# Patient Record
Sex: FEMALE | Race: WHITE | NOT HISPANIC OR LATINO | Employment: UNEMPLOYED | URBAN - METROPOLITAN AREA
[De-identification: names, ages, dates, MRNs, and addresses within clinical notes are randomized per-mention and may not be internally consistent; named-entity substitution may affect disease eponyms.]

---

## 2017-02-17 ENCOUNTER — OFFICE VISIT - HEALTHEAST (OUTPATIENT)
Dept: ENDOCRINOLOGY | Facility: CLINIC | Age: 27
End: 2017-02-17

## 2017-02-17 DIAGNOSIS — E03.9 HYPOTHYROIDISM, UNSPECIFIED TYPE: ICD-10-CM

## 2017-02-17 DIAGNOSIS — E03.4 HYPOTHYROIDISM DUE TO ACQUIRED ATROPHY OF THYROID: ICD-10-CM

## 2017-02-17 DIAGNOSIS — J02.9 SORE THROAT: ICD-10-CM

## 2017-02-20 ENCOUNTER — COMMUNICATION - HEALTHEAST (OUTPATIENT)
Dept: HEALTH INFORMATION MANAGEMENT | Facility: CLINIC | Age: 27
End: 2017-02-20

## 2017-02-23 ENCOUNTER — OFFICE VISIT - HEALTHEAST (OUTPATIENT)
Dept: FAMILY MEDICINE | Facility: CLINIC | Age: 27
End: 2017-02-23

## 2017-02-23 DIAGNOSIS — H61.21 IMPACTED CERUMEN OF RIGHT EAR: ICD-10-CM

## 2017-02-23 DIAGNOSIS — J02.9 SORE THROAT: ICD-10-CM

## 2017-02-23 ASSESSMENT — MIFFLIN-ST. JEOR: SCORE: 1293.74

## 2017-04-06 ENCOUNTER — OFFICE VISIT - HEALTHEAST (OUTPATIENT)
Dept: FAMILY MEDICINE | Facility: CLINIC | Age: 27
End: 2017-04-06

## 2017-04-06 DIAGNOSIS — M54.2 NECK PAIN ON RIGHT SIDE: ICD-10-CM

## 2017-04-06 DIAGNOSIS — S03.00XA TMJ (DISLOCATION OF TEMPOROMANDIBULAR JOINT): ICD-10-CM

## 2017-04-06 ASSESSMENT — MIFFLIN-ST. JEOR: SCORE: 1281.95

## 2017-10-13 ENCOUNTER — COMMUNICATION - HEALTHEAST (OUTPATIENT)
Dept: SCHEDULING | Facility: CLINIC | Age: 27
End: 2017-10-13

## 2017-10-13 DIAGNOSIS — N61.0 MASTITIS: ICD-10-CM

## 2017-10-24 ENCOUNTER — COMMUNICATION - HEALTHEAST (OUTPATIENT)
Dept: ENDOCRINOLOGY | Facility: CLINIC | Age: 27
End: 2017-10-24

## 2017-10-24 DIAGNOSIS — E03.4 HYPOTHYROIDISM DUE TO ACQUIRED ATROPHY OF THYROID: ICD-10-CM

## 2017-10-24 RX ORDER — THYROID 30 MG/1
TABLET ORAL
Qty: 270 TABLET | Refills: 2 | Status: ON HOLD | OUTPATIENT
Start: 2017-10-24 | End: 2023-05-23

## 2017-11-09 ENCOUNTER — OFFICE VISIT - HEALTHEAST (OUTPATIENT)
Dept: MIDWIFE SERVICES | Facility: CLINIC | Age: 27
End: 2017-11-09

## 2017-11-09 DIAGNOSIS — R87.610 ATYPICAL SQUAMOUS CELLS OF UNDETERMINED SIGNIFICANCE (ASCUS) ON PAPANICOLAOU SMEAR OF CERVIX: ICD-10-CM

## 2017-11-09 DIAGNOSIS — N91.2 AMENORRHEA: ICD-10-CM

## 2017-11-09 DIAGNOSIS — R63.6 UNDERWEIGHT: ICD-10-CM

## 2017-11-09 DIAGNOSIS — E03.9 HYPOTHYROIDISM: ICD-10-CM

## 2017-11-09 DIAGNOSIS — R10.30 LOWER ABDOMINAL PAIN: ICD-10-CM

## 2017-11-09 DIAGNOSIS — R79.89 LOW TSH LEVEL: ICD-10-CM

## 2017-11-09 DIAGNOSIS — K64.9 HEMORRHOIDS: ICD-10-CM

## 2017-11-09 RX ORDER — HYDROCORTISONE ACETATE 25 MG/1
SUPPOSITORY RECTAL
Qty: 30 SUPPOSITORY | Refills: 0 | Status: ON HOLD | OUTPATIENT
Start: 2017-11-09 | End: 2023-05-18

## 2017-11-09 ASSESSMENT — MIFFLIN-ST. JEOR: SCORE: 1270.15

## 2017-12-08 ENCOUNTER — OFFICE VISIT - HEALTHEAST (OUTPATIENT)
Dept: FAMILY MEDICINE | Facility: CLINIC | Age: 27
End: 2017-12-08

## 2017-12-08 DIAGNOSIS — J02.9 SORE THROAT: ICD-10-CM

## 2017-12-08 ASSESSMENT — MIFFLIN-ST. JEOR: SCORE: 1268.34

## 2018-07-12 ENCOUNTER — OFFICE VISIT - HEALTHEAST (OUTPATIENT)
Dept: MIDWIFE SERVICES | Facility: CLINIC | Age: 28
End: 2018-07-12

## 2018-07-12 DIAGNOSIS — E03.9 HYPOTHYROIDISM: ICD-10-CM

## 2018-07-12 DIAGNOSIS — R63.6 UNDERWEIGHT: ICD-10-CM

## 2018-07-12 DIAGNOSIS — Z01.419 WELL WOMAN EXAM: ICD-10-CM

## 2018-07-12 DIAGNOSIS — R87.610 ATYPICAL SQUAMOUS CELLS OF UNDETERMINED SIGNIFICANCE (ASCUS) ON PAPANICOLAOU SMEAR OF CERVIX: ICD-10-CM

## 2018-07-12 DIAGNOSIS — Z12.4 CERVICAL CANCER SCREENING: ICD-10-CM

## 2018-07-12 ASSESSMENT — MIFFLIN-ST. JEOR: SCORE: 1268.34

## 2018-07-13 LAB
HPV SOURCE: NORMAL
HUMAN PAPILLOMA VIRUS 16 DNA: NEGATIVE
HUMAN PAPILLOMA VIRUS 18 DNA: NEGATIVE
HUMAN PAPILLOMA VIRUS FINAL DIAGNOSIS: NORMAL
HUMAN PAPILLOMA VIRUS OTHER HR: NEGATIVE
SPECIMEN DESCRIPTION: NORMAL

## 2018-07-16 ENCOUNTER — COMMUNICATION - HEALTHEAST (OUTPATIENT)
Dept: ADMINISTRATIVE | Facility: CLINIC | Age: 28
End: 2018-07-16

## 2018-08-21 ENCOUNTER — COMMUNICATION - HEALTHEAST (OUTPATIENT)
Dept: MIDWIFE SERVICES | Facility: CLINIC | Age: 28
End: 2018-08-21

## 2018-10-27 ENCOUNTER — COMMUNICATION - HEALTHEAST (OUTPATIENT)
Dept: ENDOCRINOLOGY | Facility: CLINIC | Age: 28
End: 2018-10-27

## 2018-10-27 DIAGNOSIS — E03.4 HYPOTHYROIDISM DUE TO ACQUIRED ATROPHY OF THYROID: ICD-10-CM

## 2018-11-06 ENCOUNTER — OFFICE VISIT - HEALTHEAST (OUTPATIENT)
Dept: FAMILY MEDICINE | Facility: CLINIC | Age: 28
End: 2018-11-06

## 2018-11-06 DIAGNOSIS — R05.3 PERSISTENT COUGH: ICD-10-CM

## 2018-11-06 DIAGNOSIS — J02.9 SORE THROAT: ICD-10-CM

## 2018-11-06 LAB — DEPRECATED S PYO AG THROAT QL EIA: NORMAL

## 2018-11-06 RX ORDER — BENZONATATE 200 MG/1
200 CAPSULE ORAL 3 TIMES DAILY PRN
Qty: 21 CAPSULE | Refills: 0 | Status: ON HOLD | OUTPATIENT
Start: 2018-11-06 | End: 2023-05-18

## 2018-11-06 RX ORDER — ALBUTEROL SULFATE 90 UG/1
2 AEROSOL, METERED RESPIRATORY (INHALATION) EVERY 6 HOURS PRN
Qty: 1 EACH | Refills: 0 | Status: SHIPPED | OUTPATIENT
Start: 2018-11-06

## 2018-11-07 LAB — GROUP A STREP BY PCR: NORMAL

## 2019-05-17 ENCOUNTER — OFFICE VISIT - HEALTHEAST (OUTPATIENT)
Dept: FAMILY MEDICINE | Facility: CLINIC | Age: 29
End: 2019-05-17

## 2019-05-17 DIAGNOSIS — M54.2 ANTERIOR NECK PAIN: ICD-10-CM

## 2019-05-17 LAB
BASOPHILS # BLD AUTO: 0 THOU/UL (ref 0–0.2)
BASOPHILS NFR BLD AUTO: 0 % (ref 0–2)
EOSINOPHIL # BLD AUTO: 0.1 THOU/UL (ref 0–0.4)
EOSINOPHIL NFR BLD AUTO: 2 % (ref 0–6)
ERYTHROCYTE [DISTWIDTH] IN BLOOD BY AUTOMATED COUNT: 10.7 % (ref 11–14.5)
HCT VFR BLD AUTO: 35.8 % (ref 35–47)
HGB BLD-MCNC: 12.3 G/DL (ref 12–16)
LYMPHOCYTES # BLD AUTO: 1.4 THOU/UL (ref 0.8–4.4)
LYMPHOCYTES NFR BLD AUTO: 36 % (ref 20–40)
MCH RBC QN AUTO: 32.8 PG (ref 27–34)
MCHC RBC AUTO-ENTMCNC: 34.3 G/DL (ref 32–36)
MCV RBC AUTO: 96 FL (ref 80–100)
MONOCYTES # BLD AUTO: 0.3 THOU/UL (ref 0–0.9)
MONOCYTES NFR BLD AUTO: 8 % (ref 2–10)
NEUTROPHILS # BLD AUTO: 2 THOU/UL (ref 2–7.7)
NEUTROPHILS NFR BLD AUTO: 54 % (ref 50–70)
PLATELET # BLD AUTO: 182 THOU/UL (ref 140–440)
PMV BLD AUTO: 6.9 FL (ref 7–10)
RBC # BLD AUTO: 3.75 MILL/UL (ref 3.8–5.4)
WBC: 3.8 THOU/UL (ref 4–11)

## 2019-05-20 ENCOUNTER — COMMUNICATION - HEALTHEAST (OUTPATIENT)
Dept: FAMILY MEDICINE | Facility: CLINIC | Age: 29
End: 2019-05-20

## 2019-05-20 ENCOUNTER — RECORDS - HEALTHEAST (OUTPATIENT)
Dept: ADMINISTRATIVE | Facility: OTHER | Age: 29
End: 2019-05-20

## 2019-05-22 ENCOUNTER — COMMUNICATION - HEALTHEAST (OUTPATIENT)
Dept: FAMILY MEDICINE | Facility: CLINIC | Age: 29
End: 2019-05-22

## 2019-07-25 ENCOUNTER — COMMUNICATION - HEALTHEAST (OUTPATIENT)
Dept: MIDWIFE SERVICES | Facility: CLINIC | Age: 29
End: 2019-07-25

## 2019-09-10 ENCOUNTER — COMMUNICATION - HEALTHEAST (OUTPATIENT)
Dept: FAMILY MEDICINE | Facility: CLINIC | Age: 29
End: 2019-09-10

## 2019-09-10 DIAGNOSIS — M54.2 ANTERIOR NECK PAIN: ICD-10-CM

## 2019-10-14 ENCOUNTER — OFFICE VISIT - HEALTHEAST (OUTPATIENT)
Dept: OTOLARYNGOLOGY | Facility: CLINIC | Age: 29
End: 2019-10-14

## 2019-10-14 DIAGNOSIS — M54.2 NECK PAIN: ICD-10-CM

## 2020-01-07 ENCOUNTER — AMBULATORY - HEALTHEAST (OUTPATIENT)
Dept: NURSING | Facility: CLINIC | Age: 30
End: 2020-01-07

## 2020-03-21 ENCOUNTER — NURSE TRIAGE (OUTPATIENT)
Dept: NURSING | Facility: CLINIC | Age: 30
End: 2020-03-21

## 2020-03-22 NOTE — TELEPHONE ENCOUNTER
Caller states she is having numbness and tingling on the left side of her body. Caller states she was reading to her children when the symptoms occurred. Caller denies any slurred speech. Triage guidelines recommend to call 911, caller verbalized and understands directives.    Reason for Disposition    [1] Weakness (i.e., paralysis, loss of muscle strength) of the face, arm / hand, or leg / foot on one side of the body AND [2] sudden onset AND [3] present now    [1] Numbness (i.e., loss of sensation) of the face, arm / hand, or leg / foot on one side of the body AND [2] sudden onset AND [3] present now    Additional Information    Negative: [1] SEVERE weakness (i.e., unable to walk or barely able to walk, requires support) AND [2] new onset or worsening    Protocols used: NEUROLOGIC DEFICIT-A-AH

## 2020-03-24 ENCOUNTER — COMMUNICATION - HEALTHEAST (OUTPATIENT)
Dept: FAMILY MEDICINE | Facility: CLINIC | Age: 30
End: 2020-03-24

## 2021-05-28 NOTE — PROGRESS NOTES
Assessment/Plan:        1. Anterior neck pain  Exam findings were discussed   Plan:   - US Neck Limited;   There are two less than 1 cm nodes adjacent to the right submandibular gland  with a normal morphology per radiology     - HM1 (CBC with Diff)  WBC of 3.8 with no other abnormal value    Plan:    Ambulatory referral to ENT    At the conclusion of the encounter the plan of care, disposition and all questions were answered and reviewed, and the patient acknowledged understanding and was involved in the decision making regarding the overall care plan.           Subjective:    Patient ID:   Shayna Christy is a 29 y.o. female with a history of hypothyroidism, presenting with anterior neck pain for the past few months getting worse over time.  There is no history of injury and been seen for it given word of assurance.  The pain is located in the right  upper front of the neck by the jaw line, and feels as if having a swollen gland.  She had a CBC done last December with low white count, told to monitor.  She feels having a tight swallowing, but no pain on swallowing.  She feels to be getting sick more often and wondering about her functioning immune system.         Review of Systems  Allergy: reviewed  General : negative  A complete 7 point review of systems was obtained and is negative other than what is stated in the HPI.        The following patient's history were reviewed and updated as appropriate:   She  has a past medical history of Allergic, Disease of thyroid gland, and Varicella.  Her family history includes Cancer in her maternal grandmother and paternal grandfather; Clotting disorder in her maternal grandmother; Heart disease in her paternal grandmother; No Medical Problems in her father; Varicose Veins in her mother..      Outpatient Encounter Medications as of 5/17/2019   Medication Sig Dispense Refill     albuterol (PROAIR HFA;PROVENTIL HFA;VENTOLIN HFA) 90 mcg/actuation inhaler Inhale 2 puffs every 6  (six) hours as needed for wheezing. 1 each 0     benzonatate (TESSALON) 200 MG capsule Take 1 capsule (200 mg total) by mouth 3 (three) times a day as needed. 21 capsule 0     diaphragm wide seal 70 mm Dprh Use as directed 1 each 0     DOCOSAHEXANOIC ACID/EPA (FISH OIL ORAL) Take 1 tablet by mouth daily.       folic acid (FOLVITE) 800 MCG tablet Take 400 mcg by mouth daily.       hydrocortisone 25 mg suppository Use as directed twice daily x 2 weeks 30 suppository 0     multivitamin therapeutic (THERAGRAN) tablet Take 1 tablet by mouth daily.       ARMOUR THYROID 30 mg Tab TAKE 3 TABLETS BY MOUTH ONCE DAILY (Patient taking differently: TAKE 2 TABLETS BY MOUTH ONCE DAILY) 270 tablet 2     CHOLINE ORAL Take 1 tablet by mouth daily.       No facility-administered encounter medications on file as of 5/17/2019.          Objective:   /64 (Patient Site: Right Arm, Patient Position: Sitting, Cuff Size: Adult Regular)   Pulse (!) 102   Wt 118 lb 11.2 oz (53.8 kg)   SpO2 98%   BMI 18.59 kg/m        Physical Exam     General Appearance:    Alert, cooperative, no distress   Head:    Normocephalic, Sinus: Non tender     Ears:    bilateral TM's and external ear canals normal   Throat:   mucous membranes moist, pharynx normal without lesions   Neck:   Supple, symmetrical, trachea midline, no adenopathy;     thyroid:  no enlargement/tenderness/nodules;    Lungs:     clear to auscultation, no wheezes, rales or rhonchi, symmetric air entry     Heart:    Regular rate and rhythm, S1 and S2 normal, no murmur, rub   or gallop   Skin:   Skin color, texture, turgor normal, no rashes or lesions

## 2021-05-30 VITALS — BODY MASS INDEX: 18.83 KG/M2 | WEIGHT: 120 LBS | HEIGHT: 67 IN

## 2021-05-30 VITALS — WEIGHT: 122.6 LBS | HEIGHT: 67 IN | BODY MASS INDEX: 19.24 KG/M2

## 2021-05-31 VITALS — WEIGHT: 117 LBS | BODY MASS INDEX: 18.36 KG/M2 | HEIGHT: 67 IN

## 2021-05-31 VITALS — BODY MASS INDEX: 18.43 KG/M2 | WEIGHT: 117.4 LBS | HEIGHT: 67 IN

## 2021-06-01 VITALS — BODY MASS INDEX: 18.36 KG/M2 | HEIGHT: 67 IN | WEIGHT: 117 LBS

## 2021-06-02 VITALS — BODY MASS INDEX: 18.31 KG/M2 | WEIGHT: 116.9 LBS

## 2021-06-02 NOTE — PROGRESS NOTES
HISTORY OF PRESENT ILLNESS  Asked to see by Dr. Ritchie for neck pain. Patient reports that she has been struggling with neck pain. The right side of the neck in different spots bothers her. Going on for months. The pain occurs daily. It doesn't wake her up at night. However if she wakes up at night she feels the pain. No problems swallowing. No change in voice. No fever, sweats or chills. One spot is under the right side of the jaw and the other is in back of her hairline on the right. No dental issues. She had a crown placed about a year and a half ago well before she noticed the pain.     REVIEW OF SYSTEMS  Review of Systems: a 10-system review was performed. Pertinent positives are noted in the HPI and on a separate scanned document in the chart.    PMH, PSH, FH and SH has documented in the EHR.      EXAM    CONSTITUTIONAL  General Appearance:  Normal, well developed, well nourished, no obvious distress  Ability to Communicate:  communicates appropriately.    HEAD AND FACE  Appearance and Symmetry:  Normal, no scalp or facial scarring or suspicious lesions.  Paranasal sinuses tenderness:  Normal, Paranasal sinuses non tender    EARS  Clinical speech reception threshold:  Normal, able to hear normal speech.  Auricle:  Normal, Auricles without scars, lesions, masses.  External auditory canal:  Normal, External auditory canal normal.  Tympanic membrane:  Normal, Tympanic membranes normal without swelling or erythema.  Tympanic membrane mobility:  Normal, Normal tympanic membrane mobility.    NOSE (speculum or scope)  Architecture:  Normal, Grossly normal external nasal architecture with no masses or lesions.  Mucosa:  Normal mucosa, No polyps or masses.  Septum:  Normal, Septum non-obstructing.  Turbinates:  Normal, No turbinate abnormalities    ORAL CAVITY AND OROPHARYNX  Lips:  Normal.  Dental and gingiva:  Normal, No obvious dental or gingival disease.  Mucosa:  Normal, Moist mucous membranes.  Tongue:   Normal, Tongue mobile with no mucosal abnormalities  Hard and soft palate:  Normal, Hard and soft palate without cleft or mucosal lesions.  Oral pharynx:  Normal, Posterior pharynx without lesions or remarkable asymmetry.  Saliva:  Normal, Clear saliva.  Masses:  Normal, No palpable masses or pathologically enlarged lymph nodes.    NECK  Masses/lymph nodes:  Normal, No worrisome neck masses or lymph nodes. She has a palpable normal sized jugulodigastric lymph node on the right.   Salivary glands:  Patient describes discomfort in the right submandibular gland with palpation. Bimanual palpation of the gland is unremarkable. No evidence of mass..  Trachea and larynx position:  Normal, Trachea and larynx midline.  Thyroid:  Normal, No thyroid abnormality.  Tenderness: Discomfort right posterior neck at the nuchal line. No evidence of palpable mass or inflammation.   Suppleness:  Normal, Neck supple    NEUROLOGICAL  Speech pattern:  Normal, Proasaic    RESPIRATORY  Symmetry and Respiratory effort:  Normal, Symmetric chest movement and expansion with no increased intercostal retractions or use of accessory muscles.     IMPRESSION  Pain right submandibular triangle and right posterior neck. The posterior neck pain is musculoskeletal. Not sure of the source of the right submandibular triangle pain. She reports the pain as a 3 out of 10 and she notices it when she is not doing anything or is distracted.     RECOMMENDATION  Observation for now. If the pain worsens or she notices swelling, I advised follow up for reevaluation.     Micah White MD

## 2021-06-03 VITALS — WEIGHT: 118.7 LBS | BODY MASS INDEX: 18.59 KG/M2

## 2021-06-08 NOTE — PROGRESS NOTES
[facility]  ENDOCRINOLOGY    Thyroid Note  2/19/2017    Shayna Christy, 1990, [id number]          Reason for visit      1. Hypothyroidism, unspecified type    2. Sore throat    3. Hypothyroidism due to acquired atrophy of thyroid        HPI     Shayna Christy is a very pleasant 26 y.o. old female who presents for follow up.  SUMMARY:  Shayna returns today in f/u for her hypothyroidism.  She is postpartum and has her beautiful son Scott and her daughter Ivett with her today.  She is feeling well.  She continues to take her [place] Thyroid daily at 90 mg.  She tells me that she has had a mild sore throat for several days, just on the right side.  She also feels that she has enlarged lymph glands in her neck.  She does not feel any compressive symptoms.      Past Medical History     Patient Active Problem List   Diagnosis     Varicose Veins     Hypothyroidism     Anti-Arthur antibodies present     Diastasis recti     Sore throat       Family History       family history includes Cancer in her maternal grandmother and paternal grandfather; Clotting disorder in her maternal grandmother; Heart disease in her paternal grandmother; No Medical Problems in her father; Varicose Veins in her mother.    Social History      reports that she has never smoked. She has never used smokeless tobacco. She reports that she does not drink alcohol or use illicit drugs.      Review of Systems     Patient denies fatigue, weight changes, heat/cold intolerance, bowel/skin changes or CVS symptoms.   Remainder per HPI and per attached intake form.      Vital Signs     Visit Vitals     BP 96/64     Pulse 70     Wt Readings from Last 3 Encounters:   09/14/16 124 lb 9.6 oz (56.5 kg)   09/08/16 122 lb 8 oz (55.6 kg)   08/12/16 119 lb 1.6 oz (54 kg)       Physical Exam     General:  Normal, NIRD,appears euthyroid  Eyes:  Pupils equal, round and reactive to light; no proptosis, lid lag or  periorbital edema.  Thyroid:  Thyroid is normal.  No  tenderness or bruit  Neck: Bilateral submandibular lymph glands palpable  Musculoskeletal:  Muscle strength grossly normal without evidence of wasting.  Heart:  Regular rate and rhythm without murmur.  Lungs:  Clear to auscultation.  Abdomen: Soft, non-tender, no masses or organomegaly  Neuro: Patella Reflexes were normal.No tremors  Skin:  No acanthosis nigricans or vitiligo          Assessment     1. Hypothyroidism, unspecified type    2. Sore throat    3. Hypothyroidism due to acquired atrophy of thyroid            Plan       Will get a CBC done today,as well as f/u Thyroid levels.  She has a very slender neck and the lymph glands are uniform and match and may just be palpable because of her anatomy.  Nevertheless, we will back it up with the lab work.  She doesn't need any refills today.  Time spent with pt today, 25 min with >50% spent in counseling and coordination of care.      Samira JOHNSON Endocrinology  2/19/2017  6:47 AM      Lab Results     TSH   Date Value Ref Range Status   02/17/2017 0.04 (L) 0.30 - 5.00 uIU/mL Final     T3, TOTAL   Date Value Ref Range Status   07/11/2016 210 (H) 45 - 175 ng/dL Final     T3, FREE   Date Value Ref Range Status   11/18/2013 4.0 (H) 1.9 - 3.9 pg/mL Final     No results found for: THYROIDAB    No results found for: G5PCNPL    Imaging Results   Last thyroid ultrasound:  No results found for this or any previous visit.    Last thyroid nuclear scan:  No results found for this or any previous visit.    Current Medications     Outpatient Medications Prior to Visit   Medication Sig Dispense Refill     CHOLINE ORAL Take 1 tablet by mouth daily.       DOCOSAHEXANOIC ACID/EPA (FISH OIL ORAL) Take 1 tablet by mouth daily.       folic acid (FOLVITE) 800 MCG tablet Take 400 mcg by mouth daily.       multivitamin therapeutic (THERAGRAN) tablet Take 1 tablet by mouth daily.       thyroid, pork, (ARMOUR THYROID) 30 mg Tab Take 3 tablets daily. 270 tablet 3     No  facility-administered medications prior to visit.

## 2021-06-09 NOTE — PROGRESS NOTES
No call necessary. Discussed during appointment that patient would be called if result is positive.   Payton Powers, CNP

## 2021-06-09 NOTE — PROGRESS NOTES
"  Assessment/Plan:         1. Neck pain on right side     2. TMJ (dislocation of temporomandibular joint)       Etiology of the neck pain is unclear at this time.  However I am suspicious that it is secondary to her TMJ.  I discussed active range of motion and passive range of motion of her neck as well as wearing a mouthguard at night that she previously was prescribed to use for her TMJ.  I also discussed jaw massage to help loosen the muscles that may be causing her to have some this pain.  She was advised to follow-up with her dentist for her TMJ or if she wishes she may obtain a referral from me to be seen with 1 of her TMJ specialists.  She is in agreement with this plan will follow-up as needed.  I reassured her that I do not believe that there is anything that is enlarged in her neck or growing that is of concern.  I also discussed that she has had normal blood work indicating no acute infection previously.  She will follow-up as needed and let me know if she would like to have referral for her TMJ.        Subjective:      Shayna Christy is a 26 y.o. female who presents for continued neck pain. This pain is present on the right side of the neck that is near her lymph node and jaw line and will go into ear. It does feel like it is near her muscle. \"it is like a headache in the neck\". She has had mildly swollen lymph nodes in her neck bilaterally. She is 9 weeks postpartum.She is able to move her neck. She has been to the chiropractor and there has not been any improvement.  She does report a positive history of TMJ with her left jaw having worse symptoms than her right. She does recall a couple weeks ago having some issues with her TMJ. She has not related the TMJ to the neck pain. She denies any loss of ROM in the neck. She is right handed.      The following portions of the patient's history were reviewed and updated as appropriate: allergies, current medications and problem list.    Review of Systems " "  Pertinent items are noted in HPI.      Objective:      /70 (Patient Site: Left Arm, Patient Position: Sitting, Cuff Size: Adult Regular)  Pulse 88  Resp 16  Ht 5' 7\" (1.702 m)  Wt 120 lb (54.4 kg)  BMI 18.79 kg/m2    General appearance: alert, appears stated age and cooperative  Head: Normocephalic, without obvious abnormality, atraumatic  Ears: normal TM's and external ear canals both ears  Throat: lips, mucosa, and tongue normal; teeth and gums normal and no tonsilar hypertrophy, no exudate. Jaw clicking with TMJ  Neck: slight anterior cervical chain adenopathy present bilaterally, no carotid bruit, no JVD, supple, symmetrical, trachea midline, thyroid not enlarged, symmetric, no tenderness/mass/nodules and ROM is within normal limits  Back: symmetric, no curvature. ROM normal. No CVA tenderness.  Lungs: clear to auscultation bilaterally  Heart: regular rate and rhythm, S1, S2 normal, no murmur, click, rub or gallop  Neurologic: Grossly normal  "

## 2021-06-14 NOTE — PROGRESS NOTES
Assessment/Plan:       1. Sore throat  Rapid strep today is negative.  This will be sent on for culture for further evaluation.  I encouraged continued symptomatic management of symptoms and to follow-up if symptoms are not improving.  Likely symptoms are secondary to an upper respiratory tract infection that is viral in nature.  - Rapid Strep A Screen-Throat  - Group A Strep, RNA Direct Detection, Throat        Subjective:      Shayna Christy is a 27 y.o. female who presents for concerns of a sore throat. Symptoms started about 2 nights ago. She is not aware of any fevers. She has had some nasal congestion and body aches yesterday. She is feeling better today when compared to symptoms yesterday.   She has had a slight cough. She denies any known contacts with strep.  2 of her children have had high fevers earlier in the week with some persistence into the last couple days.  She would like to be evaluated for strep throat before the weekend starts.  She denies any headaches, cough, abdominal pain, vomiting, or diarrhea.  She also denies any chest pain, shortness of breath, or difficulty breathing.    The following portions of the patient's history were reviewed and updated as appropriate: allergies, current medications and problem list.    Review of Systems   Pertinent items are noted in HPI.      Objective:      /60  Temp 97.7  F (36.5  C)  Resp 16  LMP  (LMP Unknown)    General Appearance: Alert, cooperative, appears slightly fatigued.  Head: Normocephalic, without obvious abnormality, atraumatic.  Eyes: PERRL, conjunctiva/corneas clear, EOM's intact.  Ears: Normal TM's and external ear canals, both ears.  Nose: Nares mildly congested.  Throat: Slightly erythematous. No exudates.  Neck: Supple, symmetrical, trachea midline, no adenopathy.  Heart : normal rate, regular rhythm, normal S1, S2, no murmurs, rubs, clicks or gallops.  Lungs: Clear to auscultation bilaterally, respirations  unlabored.  Extremities: Extremities normal, atraumatic, no cyanosis or edema.  Lymph nodes: Cervical, supraclavicular, and axillary nodes normal.      Recent Results (from the past 168 hour(s))   Rapid Strep A Screen-Throat   Result Value Ref Range    Rapid Strep A Antigen No Group A Strep detected, presumptive negative No Group A Strep detected, presumptive negative

## 2021-06-14 NOTE — PROGRESS NOTES
"Assessment:     1.  Amenorrhea, likely lactational  2.  Lower abdominal cramping  3.  Hypothyroidism (long-standing) on medication therapy  4.  Low TSH (most recent 2/17/2017)  5.  Breast-feeding  6.  Underweight  7.  Hemorrhoids since recent pregnancy     Plan:      1. Discussed nutrition and exercise.    2. Blood tests: Thyroid cascade, quantitative beta hCG.  Cultures: Wet prep and GC/CT after verbal consent.  3.  Prescription for Anusol HC rectal suppositories: Twice daily ×2 weeks, #30, no refills  4. Contraception: Diaphragm or condoms  5.  Next pap due anywhere he 2019. HPV co-testing discussed with that pap, then paps q 5 yrs if both negative.  6.  Explained that amenorrhea is likely lactational but possibly related to thyroid function as well.    7.  RTC 2 months for annual physical exam, PRN    Total time spent with patient: 25 minutes greater than 50% of which was spent counseling and coordinating care    Subjective:      Shayna Christy is a 27 y.o. female who presents for intermittent lower abdominal cramping and vaginal spotting ×2 in October 2017.  \"It is like my period is trying to get started.\"  Denies fever, chills, nausea, vomiting, dysuria, unusual vaginal discharge/irritation/pruritus/malodor, constipation or diarrhea.  Patient is about 10 months postpartum, lactating/breast-feeding about 5 times per day.  Resumed menstrual cycle at 9 months postpartum with both of her daughters.  First daughter weaned herself at about 10 months and the second daughter at about 16 months.  Notably, TSH was decreased on 2/17/2017, and [place] Thyroid dosage was decreased from 90 mg per day to 60 mg per day without a follow-up TSH level.  Patient states she drinks plenty of water, eats a healthy diet including lots of vegetables, and exercises regularly at the gym.  She is about 10 pounds heavier now than she was at this time postpartum after the birth of her first daughter.  She has not experienced any recent " weight gain, weight loss, skin or hair changes.  Continues to struggle with hemorrhoids after the birth of her most recent baby.  She uses external cortisone cream for itching as needed.  Using diaphragm or condoms for birth control method.  Home pregnancy test was negative.    Healthy Habits:   Regular Exercise: Yes   Healthy Diet: Yes  Sexually active: Yes  STI risk No      There is no immunization history on file for this patient.    Gynecologic History  No LMP recorded (lmp unknown).  Contraception: diaphragm    Cancer screening:   Last Pap: 2016. Results were: ASCUS with negative HPV      OB History    Para Term  AB Living   3 3 3   3   SAB TAB Ectopic Multiple Live Births       3      # Outcome Date GA Lbr Rich/2nd Weight Sex Delivery Anes PTL Lv   3 Term 17 38w6d  6 lb 7 oz (2.92 kg) M Vag-Spont EPI N JACQUELYN   2 Term 13 40w0d 08:00 7 lb 8 oz (3.402 kg) F Vag-Spont EPI N JACQUELYN   1 Term 12 41w0d 14:00 7 lb 11 oz (3.487 kg) F Vag-Spont None N JACQUELYN          Current Outpatient Prescriptions   Medication Sig Dispense Refill     ARMOUR THYROID 30 mg Tab TAKE 3 TABLETS BY MOUTH ONCE DAILY 270 tablet 2     diaphragm wide seal 70 mm Dprh Use as directed       folic acid (FOLVITE) 800 MCG tablet Take 400 mcg by mouth daily.       multivitamin therapeutic (THERAGRAN) tablet Take 1 tablet by mouth daily.       CHOLINE ORAL Take 1 tablet by mouth daily.       DOCOSAHEXANOIC ACID/EPA (FISH OIL ORAL) Take 1 tablet by mouth daily.       hydrocortisone 25 mg suppository Use as directed twice daily x 2 weeks 30 suppository 0     No current facility-administered medications for this visit.      Past Medical History:   Diagnosis Date     Allergic     Tdap vaccine     Disease of thyroid gland     hypothyroid     Varicella     as child     Past Surgical History:   Procedure Laterality Date     WISDOM TOOTH EXTRACTION       Blood-group specific substance; Diptheria-tetanus-pertuss vacc; and Other  "allergy (see comments)  Family History   Problem Relation Age of Onset     Varicose Veins Mother      No Medical Problems Father      Cancer Maternal Grandmother      Clotting disorder Maternal Grandmother      Heart disease Paternal Grandmother      Cancer Paternal Grandfather      Social History     Social History     Marital status:      Spouse name: John     Number of children: 3     Years of education: College     Occupational History     Stay at home mom      Social History Main Topics     Smoking status: Never Smoker     Smokeless tobacco: Never Used     Alcohol use No      Comment: none while pregnant     Drug use: No     Sexual activity: Yes     Partners: Male     Other Topics Concern     Not on file     Social History Narrative    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       Review of Systems  General:  Denies problem  Gastrointestinal:  Lower abdominal cramping.  See HPI please.  Genitourinary: denies problems.  See HPI please.  Psychiatric: denies problems  Endocrine: denies problems        Objective:         Vitals:    11/09/17 1428   BP: 102/60   Pulse: 88   Weight: 117 lb 6.4 oz (53.3 kg)   Height: 5' 7\" (1.702 m)       Physical Exam:  General Appearance: Alert, cooperative, no distress, appears stated age.  Good eye contact.  Patient appears frustrated.  Skin: Skin color, texture, turgor normal, no rashes or lesions  Neck: Supple, symmetrical, trachea midline, no adenopathy;  thyroid: not enlarged, symmetric, no tenderness/mass/nodules  Lungs: Clear to auscultation bilaterally, respirations unlabored  Heart: Regular rate and rhythm, S1 and S2 normal  Abdomen: Soft, non-tender. No organomegaly or masses.  Bowel sounds within normal limits in all 4 quadrants.  Pelvic:External genitalia normal without lesions or irritation. Vagina and cervix show no lesions, inflammation, discharge or tenderness. No cystocele, No rectocele. Uterus & adnexal normal without masses or tenderness.  Small vulvar " varicosity noted on the left side.  No obvious external hemorrhoids visible.

## 2021-06-16 PROBLEM — Z78.9 USES BIRTH CONTROL: Status: ACTIVE | Noted: 2018-07-12

## 2021-06-16 PROBLEM — Z01.419 WELL WOMAN EXAM: Status: ACTIVE | Noted: 2018-07-12

## 2021-06-16 PROBLEM — Z12.4 CERVICAL CANCER SCREENING: Status: ACTIVE | Noted: 2018-07-12

## 2021-06-16 PROBLEM — K64.9 HEMORRHOIDS: Status: ACTIVE | Noted: 2017-11-09

## 2021-06-16 PROBLEM — R87.610 ATYPICAL SQUAMOUS CELLS OF UNDETERMINED SIGNIFICANCE (ASCUS) ON PAPANICOLAOU SMEAR OF CERVIX: Status: ACTIVE | Noted: 2017-11-09

## 2021-06-19 NOTE — PROGRESS NOTES
"Assessment:     1.  Annual well woman exam  2.  History of ascus Pap with negative HPV  3.  Cervical cancer screening  4.  Contraceptive management  5.  Hypothyroidism  6.  Underweight       Plan:      1. Discussed nutrition and exercise.  Advised MVI, Vitamin D3 4000IU geltab and an omega 3 supplement daily   2. Blood tests: declines all HM /screening labs  3. Breast self exam technique reviewed and patient encouraged to perform self-exam monthly.   4. Contraception: Prescribed diaphragm as requested by patient.  She exhibits very good understanding of its placement, use of spermicidal gel, timeframe it may remain and its general use.  5.  Next pap due 2019 but performed today. HPV co-testing discussed with that pap, then paps q 5 yrs if both negative.  6.  RTC 1 year for annual physical exam, PRN  7.  Follow-up with endocrinology as scheduled for management of hypothyroidism.    Subjective:      Shayna Christy is a 28 y.o. female who presents for an annual exam.  Pap smear nearly due.  States she is scheduled to see a new endocrinologist this summer to help manage hypothyroidism.  Requesting a diaphragm for contraceptive management.  States that she has used one for years successfully becoming pregnant within the first month after not using the diaphragm, \"I am very fertile.\"    Healthy Habits:   Regular Exercise: Yes   Sunscreen Use: Yes  Healthy Diet: Yes  Dental Visits Regularly: Yes  Seat Belt: Yes  Sexually active: Yes  STI risk No  domestic violence No    Self Breast Exam Monthly:Yes  Colonoscopy: N/A  Lipid Profile: No  Glucose Screen: No  Prevention of Osteoporosis: No  Last Dexa: N/A        Gynecologic History  Patient's last menstrual period was 2018 (exact date).  Contraception: Condoms, diaphragm prescribed today    Cancer screening:   Last Pap: 2016. Results were: ASCUS with negative HPV      OB History    Para Term  AB Living   3 3 3   3   SAB TAB Ectopic Multiple " Live Births       3      # Outcome Date GA Lbr Rich/2nd Weight Sex Delivery Anes PTL Lv   3 Term 01/28/17 38w6d  6 lb 7 oz (2.92 kg) M Vag-Spont EPI N JACQUELYN   2 Term 12/16/13 40w0d 08:00 7 lb 8 oz (3.402 kg) F Vag-Spont EPI N JACQUELYN   1 Term 02/09/12 41w0d 14:00 7 lb 11 oz (3.487 kg) F Vag-Spont None N JACQUELYN          Current Outpatient Prescriptions   Medication Sig Dispense Refill     ARMOUR THYROID 30 mg Tab TAKE 3 TABLETS BY MOUTH ONCE DAILY (Patient taking differently: TAKE 2 TABLETS BY MOUTH ONCE DAILY) 270 tablet 2     CHOLINE ORAL Take 1 tablet by mouth daily.       DOCOSAHEXANOIC ACID/EPA (FISH OIL ORAL) Take 1 tablet by mouth daily.       multivitamin therapeutic (THERAGRAN) tablet Take 1 tablet by mouth daily.       diaphragm wide seal 70 mm Dprh Use as directed 1 each 0     folic acid (FOLVITE) 800 MCG tablet Take 400 mcg by mouth daily.       hydrocortisone 25 mg suppository Use as directed twice daily x 2 weeks 30 suppository 0     No current facility-administered medications for this visit.      Past Medical History:   Diagnosis Date     Allergic     Tdap vaccine     Disease of thyroid gland     hypothyroid     Varicella     as child     Past Surgical History:   Procedure Laterality Date     WISDOM TOOTH EXTRACTION  2006     Blood-group specific substance; Diptheria-tetanus-pertuss vacc; and Other allergy (see comments)  Family History   Problem Relation Age of Onset     Varicose Veins Mother      No Medical Problems Father      Cancer Maternal Grandmother      Clotting disorder Maternal Grandmother      Heart disease Paternal Grandmother      Cancer Paternal Grandfather      Social History     Social History     Marital status:      Spouse name: John     Number of children: 3     Years of education: College     Occupational History     Stay at home mom      Social History Main Topics     Smoking status: Never Smoker     Smokeless tobacco: Never Used     Alcohol use No      Comment: occ     Drug use:  "No     Sexual activity: Yes     Partners: Male     Other Topics Concern     Not on file     Social History Narrative    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       Review of Systems  General:  Denies problem  Eyes: Denies problem  Ears/Nose/Throat: Denies problem  Cardiovascular: Denies problem  Respiratory:  Denies problem  Gastrointestinal:  denies problems  Genitourinary: denies problems  Musculoskeletal:  Denies problem  Skin: Denies problem  Neurologic:denies problems  Psychiatric: denies problems  Endocrine: Hypothyroidism in good control        Objective:         Vitals:    07/12/18 1008   BP: 92/60   Pulse: 80   Weight: 117 lb (53.1 kg)   Height: 5' 7\" (1.702 m)       Physical Exam:  General Appearance: Alert, cooperative, no distress, appears stated age  Skin: Skin color, texture, turgor normal, no rashes or lesions  Throat: Lips, mucosa, and tongue normal; teeth and gums normal  HEENT: grossly normal; otoscopic and opthalmic exam not performed.   Neck: Supple, symmetrical, trachea midline, no adenopathy;  thyroid: not enlarged, symmetric, no tenderness/mass/nodules  Lungs: Clear to auscultation bilaterally, respirations unlabored  Breasts: No breast masses, tenderness, asymmetry, or nipple discharge.  Heart: Regular rate and rhythm, S1 and S2 normal, no murmur  Abdomen: Soft, non-tender. No organomegaly or masses  Pelvic:External genitalia normal without lesions or irritation. Vagina and cervix show no lesions, inflammation, discharge or tenderness. No cystocele, No rectocele. Uterus & adnexal normal without masses or tenderness.       "

## 2021-06-19 NOTE — LETTER
Letter by Daniel Ritchie MD at      Author: Daniel Ritchie MD Service: -- Author Type: --    Filed:  Encounter Date: 5/22/2019 Status: (Other)         Shayna Christy  [address] [facility] [address]             May 22, 2019         Dear Ms. Christy,    Below are the results from your recent visit:    Resulted Orders   US Neck Limited    Narrative    EXAM DATE:         05/20/2019    EXAM: US SOFT TISSUE HEAD/NECK  LOCATION: [facility]  DATE/TIME: 5/20/2019 7:00 PM    INDICATION: Right submandibular pain.  COMPARISON: None.  TECHNIQUE: Routine.    FINDINGS: Study done by technologist only.    The right submandibular gland is normal. There are two less than one cm nodes  adjacent to the gland with a normal morphology. It is unclear if these are  palpable.    Thyroid gland is normal. The right lobe measures 4.4 x 1 x 0.9 cm and the left  3.6 x 0.8 x 0.9 cm. Isthmus is normal.    CONCLUSION:  1.  There are two less than 1 cm nodes adjacent to the right submandibular gland  with a normal morphology. It's unclear if these are palpable. There are  certainly no other abnormalities noted.                      Normal Ultrasound study.    Consider a follow up to ENT if persisting symptoms           Please call with questions or contact us using [employer].    Sincerely,        Electronically signed by Daniel Ritchie MD

## 2021-06-19 NOTE — LETTER
Letter by Daniel Ritchie MD at      Author: Daniel Ritchie MD Service: -- Author Type: --    Filed:  Encounter Date: 5/20/2019 Status: (Other)         Shayna Christy  [address] [facility] [address]             May 20, 2019         Dear MsKarlos Christy,    Below are the results from your recent visit:    Resulted Orders   HM1 (CBC with Diff)   Result Value Ref Range    WBC 3.8 (L) 4.0 - 11.0 thou/uL    RBC 3.75 (L) 3.80 - 5.40 mill/uL    Hemoglobin 12.3 12.0 - 16.0 g/dL    Hematocrit 35.8 35.0 - 47.0 %    MCV 96 80 - 100 fL    MCH 32.8 27.0 - 34.0 pg    MCHC 34.3 32.0 - 36.0 g/dL    RDW 10.7 (L) 11.0 - 14.5 %    Platelets 182 140 - 440 thou/uL    MPV 6.9 (L) 7.0 - 10.0 fL    Neutrophils % 54 50 - 70 %    Lymphocytes % 36 20 - 40 %    Monocytes % 8 2 - 10 %    Eosinophils % 2 0 - 6 %    Basophils % 0 0 - 2 %    Neutrophils Absolute 2.0 2.0 - 7.7 thou/uL    Lymphocytes Absolute 1.4 0.8 - 4.4 thou/uL    Monocytes Absolute 0.3 0.0 - 0.9 thou/uL    Eosinophils Absolute 0.1 0.0 - 0.4 thou/uL    Basophils Absolute 0.0 0.0 - 0.2 thou/uL                Mildly decreased white count     Lab values marked  (L) are not clinically/medically significant    Awaiting the Ultrasound         Please call with questions or contact us using [employer].    Sincerely,        Electronically signed by Daniel Ritchie MD

## 2021-06-21 NOTE — PROGRESS NOTES
Subjective:      Patient ID: Shayna Christy is a 28 y.o. female.    Chief Complaint:    HPI Shayna Christy is a 28 y.o. female who presents today complaining of cough and cold symptoms x 1.25 months. Her sxs include sore throat and cough and they have been waxing and waning throughout the month. No known sick contacts. No recent travel. She has taken Nyquil during the first weekend, but since then she has not been taking anything.  She denies any fevers, but has had some intermittent night sweats a few nights.  She denies any nasal congestion, ear pain, runny nose, sinus pain, shortness of breath, chest pain, abdominal pain, diarrhea, nausea, or vomiting.  She had a headache yesterday that was frontal.  Her cough is mostly dry.  She reports a history of exercise-induced asthma when she was a child, but she has not had any issues with that in many years and does not have an albuterol inhaler.  She has 3 young children at home, but none of the family members in her household have been having any sick symptoms.  She states that she had mono when she was young.  She denies any symptoms of GERD or history of heartburn.      Social History   Substance Use Topics     Smoking status: Never Smoker     Smokeless tobacco: Never Used     Alcohol use No      Comment: occ       Review of Systems   Constitutional: Negative for fever.        (+) intermittent night sweats   HENT: Positive for sore throat. Negative for congestion, ear pain, rhinorrhea and sinus pain.    Respiratory: Positive for cough. Negative for shortness of breath.    Cardiovascular: Negative for chest pain.   Gastrointestinal: Negative for abdominal pain, diarrhea, nausea and vomiting.   Neurological: Positive for headaches (yesterday only).       Objective:     /60 (Patient Site: Right Arm, Patient Position: Sitting, Cuff Size: Adult Regular)  Pulse 97  Temp 97.6  F (36.4  C) (Oral)   Resp 16  Wt 116 lb 14.4 oz (53 kg)  LMP 10/06/2018 (Approximate)   SpO2 99%  Breastfeeding? No  BMI 18.31 kg/m2    Physical Exam   Constitutional: She appears well-developed and well-nourished. No distress.   HENT:   Head: Normocephalic and atraumatic.   Right Ear: Tympanic membrane, external ear and ear canal normal.   Left Ear: Tympanic membrane, external ear and ear canal normal.   Mouth/Throat: Uvula is midline. Posterior oropharyngeal erythema present. No oropharyngeal exudate or posterior oropharyngeal edema.   Eyes: Conjunctivae are normal.   Neck: Normal range of motion. Neck supple.   Cardiovascular: Normal rate, regular rhythm and normal heart sounds.  Exam reveals no gallop and no friction rub.    No murmur heard.  Pulmonary/Chest: Effort normal and breath sounds normal. No respiratory distress. She has no wheezes. She has no rales.   Lymphadenopathy:     She has no cervical adenopathy.   Skin: She is not diaphoretic.   Psychiatric: She has a normal mood and affect. Her behavior is normal. Judgment and thought content normal.   Nursing note and vitals reviewed.    Clinical Decision Making:  Physical exam is benign.  I suspect multiple viral illnesses considering the waxing and waning nature of this cold.  RST was negative, confirmatory strep test pending.  Patient was  reassured that her lungs are sounding clear.  Recommend that she follow-up if her symptoms worsen or persist.  She was prescribed albuterol and benzonatate for possible bronchitis.     Assessment:     Procedures    1. Persistent cough  albuterol (PROAIR HFA;PROVENTIL HFA;VENTOLIN HFA) 90 mcg/actuation inhaler    benzonatate (TESSALON) 200 MG capsule   2. Sore throat  Rapid Strep A Screen-Throat    Group A Strep, RNA Direct Detection, Throat         Patient Instructions   There were no signs of pneumonia.    I have prescribed an albuterol inhaler to help with the cough/wheezing.    May take Tessalon Perles as needed for cough.  May also try to use Mucinex or Robitussin.    Please monitor symptoms  carefully.  If developing chest pain, shortness of breath, fever, coughing up blood, extreme fatigue, or any other new, concerning symptoms, come back to clinic or go to ER immediately.  Otherwise, if no improvement in symptoms in one week, follow-up with your primary care provider.

## 2021-06-26 ENCOUNTER — HEALTH MAINTENANCE LETTER (OUTPATIENT)
Age: 31
End: 2021-06-26

## 2021-08-06 NOTE — PROGRESS NOTES
"  Assessment/Plan:       1. Sore throat  Rapid strep negative today. This will be sent on for culture. I discussed with the patient that it is likely a virus causing her to have some lymph node swelling. She was encouraged to follow up if symptoms do not improve.   - Rapid Strep A Screen-Throat  - Group A Strep, RNA Direct Detection, Throat    2. Impacted cerumen of right ear  Cerumen impaction is likely the cause of the right ear pain. I removed the cerumen with the lightly speculum. Moderate amount of cerumen was removed from the right ear and small amount removed from the left ear. Bilateral ears were clear of debris following procedure. The procedure was tolerated well by the patient. She was encouraged to follow up if she continues to have ear pain.     Subjective:      Shayna Christy is a 26 y.o. female who presents for sore throat. She reports that she has had right sided ear pain and neck/ throat pain with a swollen lymph node. She denies fevers, sinus pain, nasal congestion, rhinitis, headaches, nausea, vomiting, or diarrhea. She is 3 weeks post partum and breast feeding. She otherwise has been feeling well. She has no other questions or concerns today.     The following portions of the patient's history were reviewed and updated as appropriate: allergies, current medications and problem list.    Review of Systems   as above 3 weeks post partum and breast feeding.       Objective:        Visit Vitals     /54 (Patient Site: Right Arm, Patient Position: Sitting, Cuff Size: Adult Regular)     Pulse 100     Resp 16     Ht 5' 7\" (1.702 m)     Wt 122 lb 9.6 oz (55.6 kg)     Breastfeeding Yes     BMI 19.2 kg/m2       General appearance: alert, appears stated age and cooperative  Head: Normocephalic, without obvious abnormality, atraumatic  Eyes: conjunctivae/corneas clear. PERRL, EOM's intact. Fundi benign.  Ears: right ear canal occulded with cerumen. Cerumen was removed with lighted speculum by myself. " Left canal has small amount of cerumen which was also removed. When Cerumen was removed TMs are dontae. Right ear canal is mildly red.   Throat: lips, mucosa, and tongue normal; teeth and gums normal  Lungs: clear to auscultation bilaterally  Heart: regular rate and rhythm, S1, S2 normal, no murmur, click, rub or gallop  Neurologic: Grossly normal   Lymph: right side anterior cervical chain with mild swelling.     Results for orders placed or performed in visit on 02/23/17   Rapid Strep A Screen-Throat   Result Value Ref Range    Rapid Strep A Antigen No Group A Strep detected No Group A Strep detected

## 2021-10-16 ENCOUNTER — HEALTH MAINTENANCE LETTER (OUTPATIENT)
Age: 31
End: 2021-10-16

## 2022-07-17 ENCOUNTER — HEALTH MAINTENANCE LETTER (OUTPATIENT)
Age: 32
End: 2022-07-17

## 2022-09-25 ENCOUNTER — HEALTH MAINTENANCE LETTER (OUTPATIENT)
Age: 32
End: 2022-09-25

## 2023-03-06 ENCOUNTER — LAB REQUISITION (OUTPATIENT)
Dept: LAB | Facility: CLINIC | Age: 33
End: 2023-03-06

## 2023-03-06 DIAGNOSIS — E03.9 HYPOTHYROIDISM, UNSPECIFIED: ICD-10-CM

## 2023-03-06 DIAGNOSIS — Z67.90 UNSPECIFIED BLOOD TYPE, RH POSITIVE: ICD-10-CM

## 2023-03-06 LAB
T3FREE SERPL-MCNC: 2.4 PG/ML (ref 2–4.4)
T4 FREE SERPL-MCNC: 0.71 NG/DL (ref 0.9–1.7)
TSH SERPL DL<=0.005 MIU/L-ACNC: 1.81 UIU/ML (ref 0.3–4.2)

## 2023-03-06 PROCEDURE — 84443 ASSAY THYROID STIM HORMONE: CPT | Performed by: NURSE PRACTITIONER

## 2023-03-06 PROCEDURE — 86850 RBC ANTIBODY SCREEN: CPT | Performed by: NURSE PRACTITIONER

## 2023-03-06 PROCEDURE — 86870 RBC ANTIBODY IDENTIFICATION: CPT | Performed by: NURSE PRACTITIONER

## 2023-03-06 PROCEDURE — 84481 FREE ASSAY (FT-3): CPT | Performed by: NURSE PRACTITIONER

## 2023-03-06 PROCEDURE — 84439 ASSAY OF FREE THYROXINE: CPT | Performed by: NURSE PRACTITIONER

## 2023-03-07 LAB
ANTIBODY SCREEN: POSITIVE
SPECIMEN EXPIRATION DATE: ABNORMAL

## 2023-03-08 LAB
ANTIBODY ID: NORMAL
SPECIMEN EXPIRATION DATE: NORMAL

## 2023-04-11 ENCOUNTER — LAB REQUISITION (OUTPATIENT)
Dept: LAB | Facility: CLINIC | Age: 33
End: 2023-04-11

## 2023-04-11 DIAGNOSIS — Z67.90 UNSPECIFIED BLOOD TYPE, RH POSITIVE: ICD-10-CM

## 2023-04-11 DIAGNOSIS — Z36.9 ENCOUNTER FOR ANTENATAL SCREENING, UNSPECIFIED: ICD-10-CM

## 2023-04-11 LAB
BASOPHILS # BLD AUTO: 0 10E3/UL (ref 0–0.2)
BASOPHILS NFR BLD AUTO: 0 %
EOSINOPHIL # BLD AUTO: 0.1 10E3/UL (ref 0–0.7)
EOSINOPHIL NFR BLD AUTO: 1 %
ERYTHROCYTE [DISTWIDTH] IN BLOOD BY AUTOMATED COUNT: 13.2 % (ref 10–15)
HCT VFR BLD AUTO: 34.9 % (ref 35–47)
HGB BLD-MCNC: 11.7 G/DL (ref 11.7–15.7)
IMM GRANULOCYTES # BLD: 0.1 10E3/UL
IMM GRANULOCYTES NFR BLD: 1 %
LYMPHOCYTES # BLD AUTO: 1.1 10E3/UL (ref 0.8–5.3)
LYMPHOCYTES NFR BLD AUTO: 14 %
MCH RBC QN AUTO: 33.6 PG (ref 26.5–33)
MCHC RBC AUTO-ENTMCNC: 33.5 G/DL (ref 31.5–36.5)
MCV RBC AUTO: 100 FL (ref 78–100)
MONOCYTES # BLD AUTO: 0.5 10E3/UL (ref 0–1.3)
MONOCYTES NFR BLD AUTO: 6 %
NEUTROPHILS # BLD AUTO: 6.4 10E3/UL (ref 1.6–8.3)
NEUTROPHILS NFR BLD AUTO: 78 %
NRBC # BLD AUTO: 0 10E3/UL
NRBC BLD AUTO-RTO: 0 /100
PLATELET # BLD AUTO: 156 10E3/UL (ref 150–450)
RBC # BLD AUTO: 3.48 10E6/UL (ref 3.8–5.2)
WBC # BLD AUTO: 8.3 10E3/UL (ref 4–11)

## 2023-04-11 PROCEDURE — 86780 TREPONEMA PALLIDUM: CPT | Performed by: OBSTETRICS & GYNECOLOGY

## 2023-04-11 PROCEDURE — 86850 RBC ANTIBODY SCREEN: CPT | Performed by: OBSTETRICS & GYNECOLOGY

## 2023-04-11 PROCEDURE — 86870 RBC ANTIBODY IDENTIFICATION: CPT | Performed by: OBSTETRICS & GYNECOLOGY

## 2023-04-11 PROCEDURE — 85025 COMPLETE CBC W/AUTO DIFF WBC: CPT | Performed by: OBSTETRICS & GYNECOLOGY

## 2023-04-11 PROCEDURE — 86886 COOMBS TEST INDIRECT TITER: CPT | Performed by: OBSTETRICS & GYNECOLOGY

## 2023-04-12 LAB
ANTIBODY ID: NORMAL
ANTIBODY SCREEN: POSITIVE
SPECIMEN EXPIRATION DATE: ABNORMAL
SPECIMEN EXPIRATION DATE: NORMAL
SPECIMEN EXPIRATION DATE: NORMAL
T PALLIDUM AB SER QL: NONREACTIVE
XXX BLOOD GROUP AB TITR SERPL: 1 {TITER}

## 2023-04-18 ENCOUNTER — TRANSFERRED RECORDS (OUTPATIENT)
Dept: HEALTH INFORMATION MANAGEMENT | Facility: CLINIC | Age: 33
End: 2023-04-18
Payer: COMMERCIAL

## 2023-04-18 ENCOUNTER — MEDICAL CORRESPONDENCE (OUTPATIENT)
Dept: HEALTH INFORMATION MANAGEMENT | Facility: CLINIC | Age: 33
End: 2023-04-18
Payer: COMMERCIAL

## 2023-04-19 ENCOUNTER — TELEPHONE (OUTPATIENT)
Dept: MULTI SPECIALTY CLINIC | Facility: CLINIC | Age: 33
End: 2023-04-19
Payer: COMMERCIAL

## 2023-04-19 ENCOUNTER — MEDICAL CORRESPONDENCE (OUTPATIENT)
Dept: HEALTH INFORMATION MANAGEMENT | Facility: CLINIC | Age: 33
End: 2023-04-19
Payer: COMMERCIAL

## 2023-04-19 NOTE — TELEPHONE ENCOUNTER
External referral from [facility], [phone]  Referring: Dr. Devon Iyer  Dx: other abnormal Glucose test    Referral and records received on 04.19 at 9:54am.

## 2023-04-21 RX ORDER — LANCETS
EACH MISCELLANEOUS
Qty: 200 EACH | Refills: 3 | Status: SHIPPED | OUTPATIENT
Start: 2023-04-21

## 2023-04-24 ENCOUNTER — VIRTUAL VISIT (OUTPATIENT)
Dept: EDUCATION SERVICES | Facility: CLINIC | Age: 33
End: 2023-04-24
Payer: COMMERCIAL

## 2023-04-24 DIAGNOSIS — O24.419 GESTATIONAL DIABETES: Primary | ICD-10-CM

## 2023-04-24 PROCEDURE — G0109 DIAB MANAGE TRN IND/GROUP: HCPCS | Mod: VID

## 2023-04-24 NOTE — GROUP NOTE
Gestational Diabetes Self-Management Education & Support  4/24/2023  [facility]     Virtual Group Class Via Medical Zoom    Start and End Time  Start Time: 1300  End Time: 1415    SUBJECTIVE / OBJECTIVE  Cultural Influences/Ethnic Background:  Not  or     Estimated Date of Delivery: Data Unavailable    1 hour OGTT  No results found for: GLU1    3 hour OGTT    Fasting  No results found for: GLF    1 hour  No results found for: GL1    2 hour  No results found for: GL2    3 hour  No results found for: GL3      Gestational Diabetes Self-Management Education & Support    Educational topics covered today:  GDM diagnosis, pathophysiology, Risks and Complications of GDM, Means of controlling GDM, Using a Blood Glucose Monitor, Blood Glucose Goals, Logging and Interpreting Glucose Results, Ketone Testing, When to Call a Diabetes Educator or OB Provider, Healthy Eating During Pregnancy, Counting Carbohydrates, Meal Planning for GDM, and Physical Activity    Educational materials provided today:   [place] Understanding Gestational Diabetes  GDM Log Book  Sharps Disposal  Care After Delivery  Pt verbalized understanding of concepts discussed and recommendations provided today.     PLAN:  Check glucose 4 times daily, before breakfast and 1 hour after each meal.     Check Ketones daily for one week, if negative, reduce testing to once a week.     Follow consistent CHO meal plan, eat CHO and protein/fat at all meals/snacks.    Call/e-mail/[employer] message diabetes educator if 3 or more blood sugars are above the goal in 1 week or if ketones are positive or with questions/concerns.        Diabetes Educator:  Blanquita Jones RD

## 2023-05-08 ENCOUNTER — VIRTUAL VISIT (OUTPATIENT)
Dept: EDUCATION SERVICES | Facility: CLINIC | Age: 33
End: 2023-05-08
Payer: COMMERCIAL

## 2023-05-08 DIAGNOSIS — O24.410 DIET CONTROLLED GESTATIONAL DIABETES MELLITUS (GDM) IN THIRD TRIMESTER: Primary | ICD-10-CM

## 2023-05-08 PROCEDURE — 98968 PH1 ASSMT&MGMT NQHP 21-30: CPT | Mod: 93

## 2023-05-08 NOTE — PROGRESS NOTES
Diabetes and Pregnancy Follow-up  Type of Service: Telephone Visit/ 24 minutes     Originating Location (Patient Location): Home  Distant Location (Provider Location): [facility]  Mode of Communication:  Telephone     Telephone Visit Start Time: 7:36 AM  Telephone Visit End Time (telephone visit stop time): 8 AM  (Video Visit through Lee Silber but patient did not have video working)     How would patient like to obtain AVS? Lee Silber      Subjective/Objective:    Shayna GARCIA Jaelyn was called for a scheduled BG review. Last date of communication was: 4/24/23.    Gestational diabetes is being managed with diet    Taking diabetes medications: no    Estimated Date of Delivery: 6/25/23 - goal is to get to 36-37 weeks  Blood Glucose/Ketone Log:    Date Ketones Fasting Post Breakfast Post Lunch Post Supper   4/26 negative 83 99 103 116   4/27 negative 87 133 94 130   4/28 negative 91 103 82* 106   4/29 negative 93 83 114 105   4/30 negative 90 127 114 127*   5/1 negative 92 83 95 123   5/2 Small (no HS snack) 80 78 92 96   5/3 negative 78 119 107 114   5/4 negative 92 107 116 114   5/5 negative 91 121 84* 96*   5/6 negative 84 148 88 118   5/7 negative 97 87 104 116   5/8 negative 88      (testing 1 hour post meals unless noted with * = 2 hours after)    Usual Intake:    Breakfast: 3 eggs, oatmeal with peanut butter and fruit OR toast and fruit   Lunch: meat and cheese sandwich and 1/2 apple   Dinner: carrots and hummus, panakukun OR burger, fries, carrots OR pizza and salad    Snack: 1/2 banana and peanut butter OR yogurt    Assessment:  Ketones: negative x12, small x1.   Fasting blood glucoses: 92% in target.  After breakfast: 92% in target.  Before lunch: n/a% in target.  After lunch: 100% in target.  Before dinner: n/a% in target.  After dinner: 92% in target.    Visit duration limited by patient's late arrival.  Patient denies questions/concerns.  Reviewed how insulin needs change during pregnancy.  Discussed  recommended meal plan, as below, given patient is pregnant with twins!  Encouraged consumption of carbohydrate + protein first at each meal and snack.  Affirmed patient for her examples of pairing carbohydrate + protein at recent meals/snacks.  Discussed impact of fat on glycemic control.  Discussed with patient labor, delivery, and post-partum.     Plan/Response:  1. Continue to test glucose 4 times per day:   Fasting (when you first awake for the day): 95 mg/dL or below   1 hour after breakfast: 140 mg/dL or below   1 hour after lunch: 140 mg/dL or below   1 hour after dinner: 140 mg/dL or below     Please bring your meter and log book to all appointments     If you miss a 1 hour after a meal test, test 2 hours after the meal.  Goal 2 hours after is 120 mg/dL or below.     2.  Check your urine ketones once a day, when you first awake for the day, until they are negative to trace for 7 days in a row.  Then decrease and check once a week.     3.  Meal Plan    Breakfast: 30 grams carbohydrate + protein   Snack: 30 grams carbohydrate + protein  Lunch: 60 grams carbohydrate + protein  Snack: 30 grams carbohydrate + protein  Dinner: 60 grams carbohydrate + protein  Snack: 30 grams carbohydrate + protein    Remember you should consume some carbohydrates every 2-3 hours while awake.    4.  Aim for 20-30 minutes of activity most days of the week (with the okay of your OB provider).      5. After delivery, check your glucose starting in the second week postpartum.  Test 4 times per week.  Twice fasting and twice 2 hours after a meal.    Fasting goal is 100 mg/dL or below   2 hours after a meal goal is 140 mg/dL or below     Please note these are different goals then when pregnant.   Bring these to your postpartum OB visit.    6.  Be screened for type 2 diabetes at 4-12 weeks postpartum and every 1-3 years there after.     7.  If you are planning future pregnancies, confirm normal glucoses before conceiving.     8.  Call  Diabetes Education at [phone] or send a [email or web address] message with:   -questions or concerns   -every 2 weeks with an update on blood sugars - next on May 22nd   -ketones that are small, moderate, or large   -3 or more blood sugars above target in a 7 day period    Heather Alcantara, MPH, RD, CDCES, LD 5/8/2023    Time Spent: 24 minutes    Any diabetes medication dose changes were made via the CDE Protocol and Collaborative Practice Agreement with the patient's referring provider. A copy of this encounter was shared with the provider.

## 2023-05-08 NOTE — LETTER
5/8/2023         RE: Shayna Christy  [address]  formerly [facility] [address]        Dear Colleague,    Thank you for referring your patient, Shayna Christy, [facility]. Please see a copy of my visit note below.    Diabetes and Pregnancy Follow-up  Type of Service: Telephone Visit/ 24 minutes     Originating Location (Patient Location): Home  Distant Location (Provider Location): [place] [facility]  Mode of Communication:  Telephone     Telephone Visit Start Time: 7:36 AM  Telephone Visit End Time (telephone visit stop time): 8 AM  (Video Visit through StormMQ but patient did not have video working)     How would patient like to obtain AVS? Storm[employer]      Subjective/Objective:    Shayna Christy was called for a scheduled BG review. Last date of communication was: 4/24/23.    Gestational diabetes is being managed with diet    Taking diabetes medications: no    Estimated Date of Delivery: 6/25/23 - goal is to get to 36-37 weeks  Blood Glucose/Ketone Log:    Date Ketones Fasting Post Breakfast Post Lunch Post Supper   4/26 negative 83 99 103 116   4/27 negative 87 133 94 130   4/28 negative 91 103 82* 106   4/29 negative 93 83 114 105   4/30 negative 90 127 114 127*   5/1 negative 92 83 95 123   5/2 Small (no HS snack) 80 78 92 96   5/3 negative 78 119 107 114   5/4 negative 92 107 116 114   5/5 negative 91 121 84* 96*   5/6 negative 84 148 88 118   5/7 negative 97 87 104 116   5/8 negative 88      (testing 1 hour post meals unless noted with * = 2 hours after)    Usual Intake:    Breakfast: 3 eggs, oatmeal with peanut butter and fruit OR toast and fruit   Lunch: meat and cheese sandwich and 1/2 apple   Dinner: carrots and hummus, panakukun OR burger, fries, carrots OR pizza and salad    Snack: 1/2 banana and peanut butter OR yogurt    Assessment:  Ketones: negative x12, small x1.   Fasting blood glucoses: 92% in target.  After breakfast: 92% in target.  Before lunch: n/a% in target.  After  lunch: 100% in target.  Before dinner: n/a% in target.  After dinner: 92% in target.    Visit duration limited by patient's late arrival.  Patient denies questions/concerns.  Reviewed how insulin needs change during pregnancy.  Discussed recommended meal plan, as below, given patient is pregnant with twins!  Encouraged consumption of carbohydrate + protein first at each meal and snack.  Affirmed patient for her examples of pairing carbohydrate + protein at recent meals/snacks.  Discussed impact of fat on glycemic control.  Discussed with patient labor, delivery, and post-partum.     Plan/Response:  1. Continue to test glucose 4 times per day:   Fasting (when you first awake for the day): 95 mg/dL or below   1 hour after breakfast: 140 mg/dL or below   1 hour after lunch: 140 mg/dL or below   1 hour after dinner: 140 mg/dL or below     Please bring your meter and log book to all appointments     If you miss a 1 hour after a meal test, test 2 hours after the meal.  Goal 2 hours after is 120 mg/dL or below.     2.  Check your urine ketones once a day, when you first awake for the day, until they are negative to trace for 7 days in a row.  Then decrease and check once a week.     3.  Meal Plan    Breakfast: 30 grams carbohydrate + protein   Snack: 30 grams carbohydrate + protein  Lunch: 60 grams carbohydrate + protein  Snack: 30 grams carbohydrate + protein  Dinner: 60 grams carbohydrate + protein  Snack: 30 grams carbohydrate + protein    Remember you should consume some carbohydrates every 2-3 hours while awake.    4.  Aim for 20-30 minutes of activity most days of the week (with the okay of your OB provider).      5. After delivery, check your glucose starting in the second week postpartum.  Test 4 times per week.  Twice fasting and twice 2 hours after a meal.    Fasting goal is 100 mg/dL or below   2 hours after a meal goal is 140 mg/dL or below     Please note these are different goals then when pregnant.   Bring  these to your postpartum OB visit.    6.  Be screened for type 2 diabetes at 4-12 weeks postpartum and every 1-3 years there after.     7.  If you are planning future pregnancies, confirm normal glucoses before conceiving.     8.  Call Diabetes Education at [phone] or send a [employer] message with:   -questions or concerns   -every 2 weeks with an update on blood sugars - next on May 22nd   -ketones that are small, moderate, or large   -3 or more blood sugars above target in a 7 day period    Heather Alcantara, MPH, RD, CDCES, LD 5/8/2023    Time Spent: 24 minutes    Any diabetes medication dose changes were made via the CDE Protocol and Collaborative Practice Agreement with the patient's referring provider. A copy of this encounter was shared with the provider.

## 2023-05-17 ENCOUNTER — TELEPHONE (OUTPATIENT)
Dept: OBGYN | Facility: HOSPITAL | Age: 33
End: 2023-05-17
Payer: COMMERCIAL

## 2023-05-18 ENCOUNTER — HOSPITAL ENCOUNTER (OUTPATIENT)
Facility: HOSPITAL | Age: 33
Discharge: HOME | End: 2023-05-18
Attending: OBSTETRICS & GYNECOLOGY | Admitting: OBSTETRICS & GYNECOLOGY
Payer: COMMERCIAL

## 2023-05-18 VITALS — BODY MASS INDEX: 23.3 KG/M2 | WEIGHT: 145 LBS | HEIGHT: 66 IN

## 2023-05-18 LAB
ABO/RH(D): ABNORMAL
ANTIBODY ID: NORMAL
ANTIBODY SCREEN: POSITIVE
HOLD SPECIMEN: NORMAL
SPECIMEN EXPIRATION DATE: ABNORMAL
SPECIMEN EXPIRATION DATE: NORMAL

## 2023-05-18 PROCEDURE — 86850 RBC ANTIBODY SCREEN: CPT | Performed by: OBSTETRICS & GYNECOLOGY

## 2023-05-18 PROCEDURE — 86870 RBC ANTIBODY IDENTIFICATION: CPT | Performed by: OBSTETRICS & GYNECOLOGY

## 2023-05-18 PROCEDURE — 86901 BLOOD TYPING SEROLOGIC RH(D): CPT | Performed by: OBSTETRICS & GYNECOLOGY

## 2023-05-18 PROCEDURE — 86780 TREPONEMA PALLIDUM: CPT | Performed by: OBSTETRICS & GYNECOLOGY

## 2023-05-18 PROCEDURE — 96372 THER/PROPH/DIAG INJ SC/IM: CPT | Performed by: OBSTETRICS & GYNECOLOGY

## 2023-05-18 PROCEDURE — 86922 COMPATIBILITY TEST ANTIGLOB: CPT | Performed by: OBSTETRICS & GYNECOLOGY

## 2023-05-18 PROCEDURE — 59025 FETAL NON-STRESS TEST: CPT | Mod: 59

## 2023-05-18 PROCEDURE — 59025 FETAL NON-STRESS TEST: CPT

## 2023-05-18 PROCEDURE — 87653 STREP B DNA AMP PROBE: CPT | Performed by: OBSTETRICS & GYNECOLOGY

## 2023-05-18 PROCEDURE — 250N000011 HC RX IP 250 OP 636: Performed by: OBSTETRICS & GYNECOLOGY

## 2023-05-18 PROCEDURE — 36415 COLL VENOUS BLD VENIPUNCTURE: CPT | Performed by: OBSTETRICS & GYNECOLOGY

## 2023-05-18 PROCEDURE — 86902 BLOOD TYPE ANTIGEN DONOR EA: CPT

## 2023-05-18 RX ORDER — VITAMIN B COMPLEX
5000 TABLET ORAL EVERY OTHER DAY
COMMUNITY

## 2023-05-18 RX ORDER — BETAMETHASONE SODIUM PHOSPHATE AND BETAMETHASONE ACETATE 3; 3 MG/ML; MG/ML
12 INJECTION, SUSPENSION INTRA-ARTICULAR; INTRALESIONAL; INTRAMUSCULAR; SOFT TISSUE ONCE
Status: COMPLETED | OUTPATIENT
Start: 2023-05-18 | End: 2023-05-18

## 2023-05-18 RX ORDER — ASCORBIC ACID 100 MG
TABLET,CHEWABLE ORAL
COMMUNITY

## 2023-05-18 RX ADMIN — BETAMETHASONE SODIUM PHOSPHATE AND BETAMETHASONE ACETATE 12 MG: 3; 3 INJECTION, SUSPENSION INTRA-ARTICULAR; INTRALESIONAL; INTRAMUSCULAR at 11:48

## 2023-05-18 NOTE — PROGRESS NOTES
Shayna arrived to [facility] for a scheduled appointment for a NST of twins at 34 weeks gestation, a second betamethasone injection, labs and a GBS culture for tomorrows induction.   NST obtained on both fetus

## 2023-05-19 ENCOUNTER — TRANSFERRED RECORDS (OUTPATIENT)
Dept: HEALTH INFORMATION MANAGEMENT | Facility: CLINIC | Age: 33
End: 2023-05-19

## 2023-05-19 ENCOUNTER — HOSPITAL ENCOUNTER (INPATIENT)
Facility: HOSPITAL | Age: 33
LOS: 4 days | Discharge: HOME | End: 2023-05-23
Attending: OBSTETRICS & GYNECOLOGY | Admitting: OBSTETRICS & GYNECOLOGY
Payer: COMMERCIAL

## 2023-05-19 DIAGNOSIS — Z98.891 S/P C-SECTION: ICD-10-CM

## 2023-05-19 DIAGNOSIS — O30.043 DICHORIONIC DIAMNIOTIC TWIN PREGNANCY IN THIRD TRIMESTER: Primary | ICD-10-CM

## 2023-05-19 LAB
BLD PROD TYP BPU: NORMAL
BLD PROD TYP BPU: NORMAL
BLOOD COMPONENT TYPE: NORMAL
BLOOD COMPONENT TYPE: NORMAL
CODING SYSTEM: NORMAL
CODING SYSTEM: NORMAL
CROSSMATCH: NORMAL
CROSSMATCH: NORMAL
GLUCOSE BLDC GLUCOMTR-MCNC: 129 MG/DL (ref 70–99)
GLUCOSE BLDC GLUCOMTR-MCNC: 146 MG/DL (ref 70–99)
GP B STREP DNA SPEC QL NAA+PROBE: NEGATIVE
UNIT ABO/RH: NORMAL
UNIT ABO/RH: NORMAL
UNIT NUMBER: NORMAL
UNIT NUMBER: NORMAL
UNIT STATUS: NORMAL
UNIT STATUS: NORMAL
UNIT TYPE ISBT: 600
UNIT TYPE ISBT: 600

## 2023-05-19 PROCEDURE — 258N000003 HC RX IP 258 OP 636: Performed by: OBSTETRICS & GYNECOLOGY

## 2023-05-19 PROCEDURE — 250N000009 HC RX 250: Performed by: OBSTETRICS & GYNECOLOGY

## 2023-05-19 PROCEDURE — 250N000013 HC RX MED GY IP 250 OP 250 PS 637: Performed by: OBSTETRICS & GYNECOLOGY

## 2023-05-19 PROCEDURE — 120N000001 HC R&B MED SURG/OB

## 2023-05-19 PROCEDURE — 250N000011 HC RX IP 250 OP 636: Performed by: OBSTETRICS & GYNECOLOGY

## 2023-05-19 PROCEDURE — 99232 SBSQ HOSP IP/OBS MODERATE 35: CPT | Performed by: NURSE PRACTITIONER

## 2023-05-19 RX ORDER — IBUPROFEN 800 MG/1
800 TABLET, FILM COATED ORAL
Status: DISCONTINUED | OUTPATIENT
Start: 2023-05-19 | End: 2023-05-21

## 2023-05-19 RX ORDER — OXYTOCIN 10 [USP'U]/ML
10 INJECTION, SOLUTION INTRAMUSCULAR; INTRAVENOUS
Status: DISCONTINUED | OUTPATIENT
Start: 2023-05-19 | End: 2023-05-21

## 2023-05-19 RX ORDER — METOCLOPRAMIDE 10 MG/1
10 TABLET ORAL EVERY 6 HOURS PRN
Status: DISCONTINUED | OUTPATIENT
Start: 2023-05-19 | End: 2023-05-21

## 2023-05-19 RX ORDER — METHYLERGONOVINE MALEATE 0.2 MG/ML
200 INJECTION INTRAVENOUS
Status: DISCONTINUED | OUTPATIENT
Start: 2023-05-19 | End: 2023-05-21

## 2023-05-19 RX ORDER — PENICILLIN G POTASSIUM 5000000 [IU]/1
5 INJECTION, POWDER, FOR SOLUTION INTRAMUSCULAR; INTRAVENOUS ONCE
Status: COMPLETED | OUTPATIENT
Start: 2023-05-19 | End: 2023-05-19

## 2023-05-19 RX ORDER — CEFAZOLIN SODIUM/WATER 2 G/20 ML
2 SYRINGE (ML) INTRAVENOUS
Status: DISCONTINUED | OUTPATIENT
Start: 2023-05-19 | End: 2023-05-19

## 2023-05-19 RX ORDER — LIDOCAINE 40 MG/G
CREAM TOPICAL
Status: DISCONTINUED | OUTPATIENT
Start: 2023-05-19 | End: 2023-05-21

## 2023-05-19 RX ORDER — CARBOPROST TROMETHAMINE 250 UG/ML
250 INJECTION, SOLUTION INTRAMUSCULAR
Status: DISCONTINUED | OUTPATIENT
Start: 2023-05-19 | End: 2023-05-21

## 2023-05-19 RX ORDER — ONDANSETRON 4 MG/1
4 TABLET, ORALLY DISINTEGRATING ORAL EVERY 6 HOURS PRN
Status: DISCONTINUED | OUTPATIENT
Start: 2023-05-19 | End: 2023-05-21

## 2023-05-19 RX ORDER — OXYTOCIN/0.9 % SODIUM CHLORIDE 30/500 ML
100-340 PLASTIC BAG, INJECTION (ML) INTRAVENOUS CONTINUOUS PRN
Status: DISCONTINUED | OUTPATIENT
Start: 2023-05-19 | End: 2023-05-21

## 2023-05-19 RX ORDER — NALOXONE HYDROCHLORIDE 0.4 MG/ML
0.2 INJECTION, SOLUTION INTRAMUSCULAR; INTRAVENOUS; SUBCUTANEOUS
Status: DISCONTINUED | OUTPATIENT
Start: 2023-05-19 | End: 2023-05-21

## 2023-05-19 RX ORDER — ONDANSETRON 2 MG/ML
4 INJECTION INTRAMUSCULAR; INTRAVENOUS EVERY 6 HOURS PRN
Status: DISCONTINUED | OUTPATIENT
Start: 2023-05-19 | End: 2023-05-21

## 2023-05-19 RX ORDER — KETOROLAC TROMETHAMINE 30 MG/ML
30 INJECTION, SOLUTION INTRAMUSCULAR; INTRAVENOUS
Status: DISCONTINUED | OUTPATIENT
Start: 2023-05-19 | End: 2023-05-21

## 2023-05-19 RX ORDER — ACETAMINOPHEN 325 MG/1
975 TABLET ORAL ONCE
Status: COMPLETED | OUTPATIENT
Start: 2023-05-19 | End: 2023-05-20

## 2023-05-19 RX ORDER — MISOPROSTOL 200 UG/1
400 TABLET ORAL
Status: DISCONTINUED | OUTPATIENT
Start: 2023-05-19 | End: 2023-05-21

## 2023-05-19 RX ORDER — CITRIC ACID/SODIUM CITRATE 334-500MG
30 SOLUTION, ORAL ORAL
Status: DISCONTINUED | OUTPATIENT
Start: 2023-05-19 | End: 2023-05-21

## 2023-05-19 RX ORDER — CEFAZOLIN SODIUM/WATER 2 G/20 ML
2 SYRINGE (ML) INTRAVENOUS
Status: COMPLETED | OUTPATIENT
Start: 2023-05-20 | End: 2023-05-20

## 2023-05-19 RX ORDER — NALOXONE HYDROCHLORIDE 0.4 MG/ML
0.4 INJECTION, SOLUTION INTRAMUSCULAR; INTRAVENOUS; SUBCUTANEOUS
Status: DISCONTINUED | OUTPATIENT
Start: 2023-05-19 | End: 2023-05-21

## 2023-05-19 RX ORDER — HYDROXYZINE HYDROCHLORIDE 50 MG/1
50 TABLET, FILM COATED ORAL 3 TIMES DAILY PRN
Status: DISCONTINUED | OUTPATIENT
Start: 2023-05-19 | End: 2023-05-23 | Stop reason: HOSPADM

## 2023-05-19 RX ORDER — SODIUM CHLORIDE, SODIUM LACTATE, POTASSIUM CHLORIDE, CALCIUM CHLORIDE 600; 310; 30; 20 MG/100ML; MG/100ML; MG/100ML; MG/100ML
INJECTION, SOLUTION INTRAVENOUS CONTINUOUS PRN
Status: DISCONTINUED | OUTPATIENT
Start: 2023-05-19 | End: 2023-05-21

## 2023-05-19 RX ORDER — NICOTINE POLACRILEX 4 MG
15-30 LOZENGE BUCCAL
Status: DISCONTINUED | OUTPATIENT
Start: 2023-05-19 | End: 2023-05-23 | Stop reason: HOSPADM

## 2023-05-19 RX ORDER — OXYTOCIN/0.9 % SODIUM CHLORIDE 30/500 ML
340 PLASTIC BAG, INJECTION (ML) INTRAVENOUS CONTINUOUS PRN
Status: DISCONTINUED | OUTPATIENT
Start: 2023-05-19 | End: 2023-05-21

## 2023-05-19 RX ORDER — DEXTROSE MONOHYDRATE 25 G/50ML
25-50 INJECTION, SOLUTION INTRAVENOUS
Status: DISCONTINUED | OUTPATIENT
Start: 2023-05-19 | End: 2023-05-23 | Stop reason: HOSPADM

## 2023-05-19 RX ORDER — TERBUTALINE SULFATE 1 MG/ML
0.25 INJECTION, SOLUTION SUBCUTANEOUS
Status: DISCONTINUED | OUTPATIENT
Start: 2023-05-19 | End: 2023-05-21

## 2023-05-19 RX ORDER — PROCHLORPERAZINE MALEATE 10 MG
10 TABLET ORAL EVERY 6 HOURS PRN
Status: DISCONTINUED | OUTPATIENT
Start: 2023-05-19 | End: 2023-05-21

## 2023-05-19 RX ORDER — MISOPROSTOL 200 UG/1
800 TABLET ORAL
Status: DISCONTINUED | OUTPATIENT
Start: 2023-05-19 | End: 2023-05-21

## 2023-05-19 RX ORDER — OXYTOCIN/0.9 % SODIUM CHLORIDE 30/500 ML
1-24 PLASTIC BAG, INJECTION (ML) INTRAVENOUS CONTINUOUS
Status: DISCONTINUED | OUTPATIENT
Start: 2023-05-19 | End: 2023-05-21

## 2023-05-19 RX ORDER — CEFAZOLIN SODIUM/WATER 2 G/20 ML
2 SYRINGE (ML) INTRAVENOUS SEE ADMIN INSTRUCTIONS
Status: DISCONTINUED | OUTPATIENT
Start: 2023-05-20 | End: 2023-05-21

## 2023-05-19 RX ORDER — PROCHLORPERAZINE 25 MG
25 SUPPOSITORY, RECTAL RECTAL EVERY 12 HOURS PRN
Status: DISCONTINUED | OUTPATIENT
Start: 2023-05-19 | End: 2023-05-21

## 2023-05-19 RX ORDER — METOCLOPRAMIDE HYDROCHLORIDE 5 MG/ML
10 INJECTION INTRAMUSCULAR; INTRAVENOUS EVERY 6 HOURS PRN
Status: DISCONTINUED | OUTPATIENT
Start: 2023-05-19 | End: 2023-05-21

## 2023-05-19 RX ORDER — MORPHINE SULFATE 10 MG/ML
10 INJECTION, SOLUTION INTRAMUSCULAR; INTRAVENOUS ONCE
Status: COMPLETED | OUTPATIENT
Start: 2023-05-19 | End: 2023-05-19

## 2023-05-19 RX ORDER — SODIUM CHLORIDE, SODIUM LACTATE, POTASSIUM CHLORIDE, CALCIUM CHLORIDE 600; 310; 30; 20 MG/100ML; MG/100ML; MG/100ML; MG/100ML
INJECTION, SOLUTION INTRAVENOUS CONTINUOUS
Status: DISCONTINUED | OUTPATIENT
Start: 2023-05-20 | End: 2023-05-21

## 2023-05-19 RX ORDER — SODIUM CHLORIDE, SODIUM LACTATE, POTASSIUM CHLORIDE, CALCIUM CHLORIDE 600; 310; 30; 20 MG/100ML; MG/100ML; MG/100ML; MG/100ML
INJECTION, SOLUTION INTRAVENOUS CONTINUOUS
Status: DISCONTINUED | OUTPATIENT
Start: 2023-05-19 | End: 2023-05-19

## 2023-05-19 RX ORDER — PENICILLIN G 3000000 [IU]/50ML
3 INJECTION, SOLUTION INTRAVENOUS EVERY 4 HOURS
Status: DISCONTINUED | OUTPATIENT
Start: 2023-05-19 | End: 2023-05-19

## 2023-05-19 RX ORDER — CEFAZOLIN SODIUM/WATER 2 G/20 ML
2 SYRINGE (ML) INTRAVENOUS SEE ADMIN INSTRUCTIONS
Status: DISCONTINUED | OUTPATIENT
Start: 2023-05-19 | End: 2023-05-19

## 2023-05-19 RX ADMIN — Medication 1 MILLI-UNITS/MIN: at 10:59

## 2023-05-19 RX ADMIN — HYDROXYZINE HYDROCHLORIDE 50 MG: 50 TABLET, FILM COATED ORAL at 23:06

## 2023-05-19 RX ADMIN — PENICILLIN G POTASSIUM 5 MILLION UNITS: 5000000 POWDER, FOR SOLUTION INTRAMUSCULAR; INTRAPLEURAL; INTRATHECAL; INTRAVENOUS at 11:18

## 2023-05-19 RX ADMIN — SODIUM CHLORIDE, POTASSIUM CHLORIDE, SODIUM LACTATE AND CALCIUM CHLORIDE 25 ML/HR: 600; 310; 30; 20 INJECTION, SOLUTION INTRAVENOUS at 10:56

## 2023-05-19 ASSESSMENT — ACTIVITIES OF DAILY LIVING (ADL)
ADLS_ACUITY_SCORE: 18
ADLS_ACUITY_SCORE: 18
WEAR_GLASSES_OR_BLIND: NO
WALKING_OR_CLIMBING_STAIRS_DIFFICULTY: NO
DOING_ERRANDS_INDEPENDENTLY_DIFFICULTY: NO
DRESSING/BATHING_DIFFICULTY: NO
FALL_HISTORY_WITHIN_LAST_SIX_MONTHS: NO
CHANGE_IN_FUNCTIONAL_STATUS_SINCE_ONSET_OF_CURRENT_ILLNESS/INJURY: NO
ADLS_ACUITY_SCORE: 18
DIFFICULTY_EATING/SWALLOWING: NO
ADLS_ACUITY_SCORE: 18
ADLS_ACUITY_SCORE: 31
TOILETING_ISSUES: NO
ADLS_ACUITY_SCORE: 31
CONCENTRATING,_REMEMBERING_OR_MAKING_DECISIONS_DIFFICULTY: NO
ADLS_ACUITY_SCORE: 35
ADLS_ACUITY_SCORE: 18

## 2023-05-19 NOTE — PLAN OF CARE
Goal Outcome Evaluation:         Spoke to Dr. Iyer we are going to do low dose pitocin, cord blood on babies d/t anti- E found in moms blood per lab, consult for NICU to see parents, consent for bilateral salpingectomy and patient is to check her own blood sugars and tell us if it is 160 or greater.

## 2023-05-19 NOTE — PLAN OF CARE
Goal Outcome Evaluation:       Baby B has been having irregular heart sounds- skipped heart beats and a galloping heart sound occasionally.

## 2023-05-19 NOTE — PLAN OF CARE
Goal Outcome Evaluation:         Patient came in for IOL 34w1 di di twins A-girl B-boy - FGR/decrease cord dopplers.  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  Checked her cervix was 2.5/60/-1 middle/medium.  Gordon 7.  Plan is pitocin low dose.  Bilateral salpingectomy is the plan and I will have her sign the consents.  FHR- baby A (140) and baby B(125).

## 2023-05-19 NOTE — CONSULTS
Neonatology Antepartum Counseling Consult:  I was asked to provide antepartum counseling for Shayna Christy at the request of Dr. Jaylon MD IOL secondary to di di twins A-girl B-boy - FGR/decrease cord dopplers.  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 Ms. Shayna Christy is currently 34 weeks/ 1 days gestation and has a history significant for:  Patient Active Problem List   Diagnosis     Varicose Veins     Hypothyroidism     Anti-Arthur antibodies present     Diastasis recti     Hemorrhoids     Underweight     Atypical squamous cells of undetermined significance (ASCUS) on Papanicolaou smear of cervix     Uses birth control     Cervical cancer screening     Well woman exam     Twins live born in hospital      Betamethasone was administered on 5/17 & 5/18/23.   Ms. Shayna Christy, accompanied by her , was counseled on the expected hospital course, potential risks, and outcomes associated with an infant born at this gestation. The counseling included:  initial delivery room stabilization, respiratory course, lung development, , hyperbilirubinemia,  infection , nutrition, growth and development, and long term outcomes.  I also explained the basic four criteria for discharge: that the baby had to be free of apnea; able to maintain their body temperature; able to feed by bottle or breast well enough to; attain an adequate pattern of weight gain and growth.  The NICU parent handbook was given to the parent(s). Mother plans to breast feed, is okay with donor milk. Prefers that the infants do NOT receive antibiotic eye ointment or Vitamin K injection.  The patient had no remaining questions but was encouraged to contact the NICU via their caregivers should any arise.  Please feel free to call and thank you for involving the NICU team in the care of your patient.      Floor Time (min): 15  Face to Face Time (min): 20  Total Time (minutes):  35  More than 50% of my time was spent in direct, face to face, antepartum counseling with the above patient.    ELOY Fajardo CNP on 5/19/2023 at 2:02 PM

## 2023-05-19 NOTE — H&P
OB ADMISSION     Date: 2023  NAME: Shayna Christy  : 1990  MRN: [id number]     CC: I am here for my induction     HPI: Shayna Christy is a 33 year old female,  with  twin inter-uterine gestation at 34w1d, with Estimated Date of Delivery: 2023. She presented to L&D with intent to start her induction secondary to twins with IUGR .  Pregnancy has been complicated by Twins, IUGR baby B, Vtx/Breech. Patient denies headache, visual changes, RUQ pain. She reports good fetal movement.    OB HISTORY   OB History    Para Term  AB Living   4 3 3 0 0 3   SAB IAB Ectopic Multiple Live Births   0 0 0 0 3      # Outcome Date GA Lbr Rich/2nd Weight Sex Delivery Anes PTL Lv   4 Current            3 Term 17 38w6d  2.92 kg (6 lb 7 oz) M Vag-Spont EPI N JACQUELYN      Name: Ivar   2 Term 13 40w0d 08:00 3.402 kg (7 lb 8 oz) F Vag-Spont EPI N JACQUELYN   1 Term 12 41w0d 14:00 3.487 kg (7 lb 11 oz) F Vag-Spont None N JACQUELYN       PAST MEDICAL HISTORY:  Past Medical History:   Diagnosis Date     Diabetes (H)         PAST SURGICAL HISTORY:   Past Surgical History:   Procedure Laterality Date     WISDOM TOOTH EXTRACTION          SOCIAL HISTORY  Reviewed, patient denies smoking, alcohol and drug use  She is  . Father is  involved    MEDICATIONS  Current Facility-Administered Medications   Medication     carboprost (HEMABATE) injection 250 mcg     glucose gel 15-30 g    Or     dextrose 50 % injection 25-50 mL    Or     glucagon injection 1 mg     ketorolac (TORADOL) injection 30 mg    Or     ketorolac (TORADOL) injection 30 mg    Or     ibuprofen (ADVIL/MOTRIN) tablet 800 mg     lactated ringers BOLUS 1,000 mL    Or     lactated ringers BOLUS 500 mL     lactated ringers infusion     lidocaine (LMX4) cream     lidocaine (LMX4) cream     lidocaine 1 % 0.1-1 mL     lidocaine 1 % 0.1-1 mL     lidocaine 1 % 0.1-20 mL     Medication Instructions - cervical ripening and induction medications      methylergonovine (METHERGINE) injection 200 mcg     metoclopramide (REGLAN) injection 10 mg    Or     metoclopramide (REGLAN) tablet 10 mg     misoprostol (CYTOTEC) tablet 400 mcg    Or     misoprostol (CYTOTEC) tablet 800 mcg     naloxone (NARCAN) injection 0.2 mg    Or     naloxone (NARCAN) injection 0.4 mg    Or     naloxone (NARCAN) injection 0.2 mg    Or     naloxone (NARCAN) injection 0.4 mg     ondansetron (ZOFRAN ODT) ODT tab 4 mg    Or     ondansetron (ZOFRAN) injection 4 mg     oxytocin (PITOCIN) 30 units in 500 mL 0.9% NaCl infusion     oxytocin (PITOCIN) 30 units in 500 mL 0.9% NaCl infusion     oxytocin (PITOCIN) 30 units in 500 mL 0.9% NaCl infusion     oxytocin (PITOCIN) injection 10 Units     oxytocin (PITOCIN) injection 10 Units     prochlorperazine (COMPAZINE) injection 10 mg    Or     prochlorperazine (COMPAZINE) tablet 10 mg    Or     prochlorperazine (COMPAZINE) suppository 25 mg     sodium chloride (PF) 0.9% PF flush 3 mL     sodium chloride (PF) 0.9% PF flush 3 mL     sodium chloride (PF) 0.9% PF flush 3 mL     sodium chloride (PF) 0.9% PF flush 3 mL     sodium citrate-citric acid (BICITRA) solution 30 mL     terbutaline (BRETHINE) injection 0.25 mg     tranexamic acid (CYKLOKAPRON) bolus 1 g vial attach to NaCl 50 or 100 mL bag ADULT       ALLERGIES  Allergies   Allergen Reactions     Blood-Group Specific Substance Other (See Comments)     Patient has an anti-Fyb and Anti-E. Blood product orders may be delayed. Draw two purple top tubes and one red top tube for all blood bank orders.     Diphtheria-Tetanus-Pertuss Vac [Diphth-Tet Tox-Pertus Vac] Other (See Comments)     Patient states allergy from D-tap vaccine      Other Allergy (See Comments) [External Allergen Needs Reconciliation - See Comment] Other (See Comments)     Patient states allergy from D-tap vaccine , DTaP, 12/02/2013.       Pertussis Vaccines [Pertussis Vaccine] Other (See Comments)     Tetanus And Diphther. Tox (Pf)  "[Tetanus-Diphtheria Toxoids Td] Other (See Comments)       ROS: otherwise negative except what is stated in HPI.     PHYSICAL EXAM   /55   Temp 98.4  F (36.9  C) (Oral)   Resp 16   Ht 1.689 m (5' 6.5\")   Wt 65.8 kg (145 lb)   BMI 23.05 kg/m     Gen: no acute distress, resting comfortably   CV: acyanotic   Heart: regular rate and rhythm   Pulm: unlabored respirations, clear to ausculation bilaterally    Abd: gravid, soft, nontender   Cervix: 2/60/-1  Extremities: soft, nontender   FHR: positive, category 1 times two  [place]: irregular contractions      LABS  @LABRSLTOB(ABORH EXT,LN-ABORH,HML ABO/RH,HGB EXT,HGB,RUBELLA EXT,LN-RUBELLA IGG ANTIBODY:Last:1)@     IMPRESSION  33 year old [unfilled] at 34w1d   twin inter uterine pregnancy at term   Pregnancy complications include: twins,IUGR,Vtx/breech  being induced secondary to the above         PLAN  - Admit to hospital   - Proceed towards delivery   - Augment with low dose pitocin, - consider artificial rupture of membranes, - epidural if patient desires and - Anticipate normal spontaneous vaginal delivery  Plan PP tubal    Devon Iyer MD        "

## 2023-05-20 ENCOUNTER — ANESTHESIA (OUTPATIENT)
Dept: OBGYN | Facility: HOSPITAL | Age: 33
End: 2023-05-20
Payer: COMMERCIAL

## 2023-05-20 ENCOUNTER — ANESTHESIA EVENT (OUTPATIENT)
Dept: OBGYN | Facility: HOSPITAL | Age: 33
End: 2023-05-20
Payer: COMMERCIAL

## 2023-05-20 ENCOUNTER — ANCILLARY PROCEDURE (OUTPATIENT)
Dept: ULTRASOUND IMAGING | Facility: HOSPITAL | Age: 33
End: 2023-05-20
Attending: ANESTHESIOLOGY
Payer: COMMERCIAL

## 2023-05-20 LAB
GLUCOSE BLDC GLUCOMTR-MCNC: 72 MG/DL (ref 70–99)
HEPATITIS B SURFACE ANTIGEN (EXTERNAL): NONREACTIVE
HGB BLD-MCNC: 11.3 G/DL (ref 11.7–15.7)
HIV1+2 AB SERPL QL IA: NONREACTIVE
RUBELLA ANTIBODY IGG (EXTERNAL): POSITIVE
T PALLIDUM AB SER QL: NONREACTIVE

## 2023-05-20 PROCEDURE — 36415 COLL VENOUS BLD VENIPUNCTURE: CPT | Performed by: STUDENT IN AN ORGANIZED HEALTH CARE EDUCATION/TRAINING PROGRAM

## 2023-05-20 PROCEDURE — 258N000003 HC RX IP 258 OP 636: Performed by: NURSE ANESTHETIST, CERTIFIED REGISTERED

## 2023-05-20 PROCEDURE — 250N000011 HC RX IP 250 OP 636: Performed by: NURSE ANESTHETIST, CERTIFIED REGISTERED

## 2023-05-20 PROCEDURE — 250N000013 HC RX MED GY IP 250 OP 250 PS 637: Performed by: OBSTETRICS & GYNECOLOGY

## 2023-05-20 PROCEDURE — 88307 TISSUE EXAM BY PATHOLOGIST: CPT | Mod: TC | Performed by: OBSTETRICS & GYNECOLOGY

## 2023-05-20 PROCEDURE — 999N000249 HC STATISTIC C-SECTION ON UNIT

## 2023-05-20 PROCEDURE — 250N000009 HC RX 250: Performed by: NURSE ANESTHETIST, CERTIFIED REGISTERED

## 2023-05-20 PROCEDURE — 85018 HEMOGLOBIN: CPT | Performed by: STUDENT IN AN ORGANIZED HEALTH CARE EDUCATION/TRAINING PROGRAM

## 2023-05-20 PROCEDURE — 360N000076 HC SURGERY LEVEL 3, PER MIN: Performed by: OBSTETRICS & GYNECOLOGY

## 2023-05-20 PROCEDURE — 370N000017 HC ANESTHESIA TECHNICAL FEE, PER MIN: Performed by: OBSTETRICS & GYNECOLOGY

## 2023-05-20 PROCEDURE — 88307 TISSUE EXAM BY PATHOLOGIST: CPT | Mod: 26 | Performed by: PATHOLOGY

## 2023-05-20 PROCEDURE — 250N000011 HC RX IP 250 OP 636: Performed by: OBSTETRICS & GYNECOLOGY

## 2023-05-20 PROCEDURE — 120N000001 HC R&B MED SURG/OB

## 2023-05-20 PROCEDURE — C9290 INJ, BUPIVACAINE LIPOSOME: HCPCS | Performed by: ANESTHESIOLOGY

## 2023-05-20 PROCEDURE — 250N000011 HC RX IP 250 OP 636: Performed by: ANESTHESIOLOGY

## 2023-05-20 PROCEDURE — 88302 TISSUE EXAM BY PATHOLOGIST: CPT | Mod: 26 | Performed by: PATHOLOGY

## 2023-05-20 PROCEDURE — 0UB70ZZ EXCISION OF BILATERAL FALLOPIAN TUBES, OPEN APPROACH: ICD-10-PCS | Performed by: OBSTETRICS & GYNECOLOGY

## 2023-05-20 PROCEDURE — 272N000001 HC OR GENERAL SUPPLY STERILE: Performed by: OBSTETRICS & GYNECOLOGY

## 2023-05-20 PROCEDURE — 250N000009 HC RX 250: Performed by: OBSTETRICS & GYNECOLOGY

## 2023-05-20 RX ORDER — CARBOPROST TROMETHAMINE 250 UG/ML
250 INJECTION, SOLUTION INTRAMUSCULAR
Status: DISCONTINUED | OUTPATIENT
Start: 2023-05-20 | End: 2023-05-23 | Stop reason: HOSPADM

## 2023-05-20 RX ORDER — ONDANSETRON 4 MG/1
4 TABLET, ORALLY DISINTEGRATING ORAL EVERY 6 HOURS PRN
Status: DISCONTINUED | OUTPATIENT
Start: 2023-05-20 | End: 2023-05-23 | Stop reason: HOSPADM

## 2023-05-20 RX ORDER — MORPHINE SULFATE 0.5 MG/ML
150 INJECTION, SOLUTION EPIDURAL; INTRATHECAL; INTRAVENOUS ONCE
Status: DISCONTINUED | OUTPATIENT
Start: 2023-05-20 | End: 2023-05-23 | Stop reason: HOSPADM

## 2023-05-20 RX ORDER — ONDANSETRON 2 MG/ML
INJECTION INTRAMUSCULAR; INTRAVENOUS PRN
Status: DISCONTINUED | OUTPATIENT
Start: 2023-05-20 | End: 2023-05-20

## 2023-05-20 RX ORDER — SODIUM CHLORIDE, SODIUM LACTATE, POTASSIUM CHLORIDE, CALCIUM CHLORIDE 600; 310; 30; 20 MG/100ML; MG/100ML; MG/100ML; MG/100ML
INJECTION, SOLUTION INTRAVENOUS CONTINUOUS
Status: DISCONTINUED | OUTPATIENT
Start: 2023-05-20 | End: 2023-05-20

## 2023-05-20 RX ORDER — ONDANSETRON 4 MG/1
4 TABLET, ORALLY DISINTEGRATING ORAL EVERY 30 MIN PRN
Status: DISCONTINUED | OUTPATIENT
Start: 2023-05-20 | End: 2023-05-20

## 2023-05-20 RX ORDER — OXYTOCIN/0.9 % SODIUM CHLORIDE 30/500 ML
340 PLASTIC BAG, INJECTION (ML) INTRAVENOUS CONTINUOUS PRN
Status: DISCONTINUED | OUTPATIENT
Start: 2023-05-20 | End: 2023-05-23 | Stop reason: HOSPADM

## 2023-05-20 RX ORDER — ONDANSETRON 2 MG/ML
4 INJECTION INTRAMUSCULAR; INTRAVENOUS EVERY 6 HOURS PRN
Status: DISCONTINUED | OUTPATIENT
Start: 2023-05-20 | End: 2023-05-23 | Stop reason: HOSPADM

## 2023-05-20 RX ORDER — IBUPROFEN 800 MG/1
800 TABLET, FILM COATED ORAL EVERY 6 HOURS
Status: DISCONTINUED | OUTPATIENT
Start: 2023-05-21 | End: 2023-05-23

## 2023-05-20 RX ORDER — HYDROCORTISONE 25 MG/G
CREAM TOPICAL 3 TIMES DAILY PRN
Status: DISCONTINUED | OUTPATIENT
Start: 2023-05-20 | End: 2023-05-23 | Stop reason: HOSPADM

## 2023-05-20 RX ORDER — KETOROLAC TROMETHAMINE 30 MG/ML
30 INJECTION, SOLUTION INTRAMUSCULAR; INTRAVENOUS EVERY 6 HOURS
Status: COMPLETED | OUTPATIENT
Start: 2023-05-20 | End: 2023-05-21

## 2023-05-20 RX ORDER — METOCLOPRAMIDE 10 MG/1
10 TABLET ORAL EVERY 6 HOURS PRN
Status: DISCONTINUED | OUTPATIENT
Start: 2023-05-20 | End: 2023-05-23 | Stop reason: HOSPADM

## 2023-05-20 RX ORDER — BUPIVACAINE HYDROCHLORIDE 7.5 MG/ML
INJECTION, SOLUTION INTRASPINAL
Status: COMPLETED | OUTPATIENT
Start: 2023-05-20 | End: 2023-05-20

## 2023-05-20 RX ORDER — OXYCODONE HYDROCHLORIDE 5 MG/1
5 TABLET ORAL EVERY 4 HOURS PRN
Status: DISCONTINUED | OUTPATIENT
Start: 2023-05-20 | End: 2023-05-23 | Stop reason: HOSPADM

## 2023-05-20 RX ORDER — LIDOCAINE 40 MG/G
CREAM TOPICAL
Status: DISCONTINUED | OUTPATIENT
Start: 2023-05-20 | End: 2023-05-23 | Stop reason: HOSPADM

## 2023-05-20 RX ORDER — FENTANYL CITRATE 50 UG/ML
50 INJECTION, SOLUTION INTRAMUSCULAR; INTRAVENOUS EVERY 5 MIN PRN
Status: DISCONTINUED | OUTPATIENT
Start: 2023-05-20 | End: 2023-05-20

## 2023-05-20 RX ORDER — METHYLERGONOVINE MALEATE 0.2 MG/ML
200 INJECTION INTRAVENOUS
Status: DISCONTINUED | OUTPATIENT
Start: 2023-05-20 | End: 2023-05-23 | Stop reason: HOSPADM

## 2023-05-20 RX ORDER — MISOPROSTOL 200 UG/1
400 TABLET ORAL
Status: DISCONTINUED | OUTPATIENT
Start: 2023-05-20 | End: 2023-05-23 | Stop reason: HOSPADM

## 2023-05-20 RX ORDER — DEXTROSE, SODIUM CHLORIDE, SODIUM LACTATE, POTASSIUM CHLORIDE, AND CALCIUM CHLORIDE 5; .6; .31; .03; .02 G/100ML; G/100ML; G/100ML; G/100ML; G/100ML
INJECTION, SOLUTION INTRAVENOUS CONTINUOUS
Status: DISCONTINUED | OUTPATIENT
Start: 2023-05-20 | End: 2023-05-23 | Stop reason: HOSPADM

## 2023-05-20 RX ORDER — OXYTOCIN 10 [USP'U]/ML
10 INJECTION, SOLUTION INTRAMUSCULAR; INTRAVENOUS
Status: DISCONTINUED | OUTPATIENT
Start: 2023-05-20 | End: 2023-05-23 | Stop reason: HOSPADM

## 2023-05-20 RX ORDER — HYDROMORPHONE HCL IN WATER/PF 6 MG/30 ML
0.2 PATIENT CONTROLLED ANALGESIA SYRINGE INTRAVENOUS EVERY 5 MIN PRN
Status: DISCONTINUED | OUTPATIENT
Start: 2023-05-20 | End: 2023-05-20

## 2023-05-20 RX ORDER — AMOXICILLIN 250 MG
2 CAPSULE ORAL 2 TIMES DAILY
Status: DISCONTINUED | OUTPATIENT
Start: 2023-05-20 | End: 2023-05-23 | Stop reason: HOSPADM

## 2023-05-20 RX ORDER — AMOXICILLIN 250 MG
1 CAPSULE ORAL 2 TIMES DAILY
Status: DISCONTINUED | OUTPATIENT
Start: 2023-05-20 | End: 2023-05-23 | Stop reason: HOSPADM

## 2023-05-20 RX ORDER — MORPHINE SULFATE 1 MG/ML
INJECTION, SOLUTION EPIDURAL; INTRATHECAL; INTRAVENOUS
Status: COMPLETED | OUTPATIENT
Start: 2023-05-20 | End: 2023-05-20

## 2023-05-20 RX ORDER — FENTANYL CITRATE 50 UG/ML
25 INJECTION, SOLUTION INTRAMUSCULAR; INTRAVENOUS EVERY 5 MIN PRN
Status: DISCONTINUED | OUTPATIENT
Start: 2023-05-20 | End: 2023-05-20

## 2023-05-20 RX ORDER — KETOROLAC TROMETHAMINE 30 MG/ML
INJECTION, SOLUTION INTRAMUSCULAR; INTRAVENOUS PRN
Status: DISCONTINUED | OUTPATIENT
Start: 2023-05-20 | End: 2023-05-20

## 2023-05-20 RX ORDER — ACETAMINOPHEN 325 MG/1
975 TABLET ORAL EVERY 6 HOURS
Status: DISCONTINUED | OUTPATIENT
Start: 2023-05-20 | End: 2023-05-23

## 2023-05-20 RX ORDER — ONDANSETRON 2 MG/ML
4 INJECTION INTRAMUSCULAR; INTRAVENOUS EVERY 30 MIN PRN
Status: DISCONTINUED | OUTPATIENT
Start: 2023-05-20 | End: 2023-05-20

## 2023-05-20 RX ORDER — FENTANYL CITRATE 50 UG/ML
INJECTION, SOLUTION INTRAMUSCULAR; INTRAVENOUS
Status: COMPLETED | OUTPATIENT
Start: 2023-05-20 | End: 2023-05-20

## 2023-05-20 RX ORDER — OXYTOCIN 10 [USP'U]/ML
10 INJECTION, SOLUTION INTRAMUSCULAR; INTRAVENOUS
Status: DISCONTINUED | OUTPATIENT
Start: 2023-05-20 | End: 2023-05-21

## 2023-05-20 RX ORDER — FENTANYL CITRATE-0.9 % NACL/PF 10 MCG/ML
100 PLASTIC BAG, INJECTION (ML) INTRAVENOUS EVERY 5 MIN PRN
Status: DISCONTINUED | OUTPATIENT
Start: 2023-05-20 | End: 2023-05-23 | Stop reason: HOSPADM

## 2023-05-20 RX ORDER — SIMETHICONE 80 MG
80 TABLET,CHEWABLE ORAL 4 TIMES DAILY PRN
Status: DISCONTINUED | OUTPATIENT
Start: 2023-05-20 | End: 2023-05-23 | Stop reason: HOSPADM

## 2023-05-20 RX ORDER — HYDROMORPHONE HCL IN WATER/PF 6 MG/30 ML
0.4 PATIENT CONTROLLED ANALGESIA SYRINGE INTRAVENOUS EVERY 5 MIN PRN
Status: DISCONTINUED | OUTPATIENT
Start: 2023-05-20 | End: 2023-05-20

## 2023-05-20 RX ORDER — NALBUPHINE HYDROCHLORIDE 20 MG/ML
2.5-5 INJECTION, SOLUTION INTRAMUSCULAR; INTRAVENOUS; SUBCUTANEOUS EVERY 6 HOURS PRN
Status: DISCONTINUED | OUTPATIENT
Start: 2023-05-20 | End: 2023-05-23 | Stop reason: HOSPADM

## 2023-05-20 RX ORDER — PROCHLORPERAZINE MALEATE 10 MG
10 TABLET ORAL EVERY 6 HOURS PRN
Status: DISCONTINUED | OUTPATIENT
Start: 2023-05-20 | End: 2023-05-23 | Stop reason: HOSPADM

## 2023-05-20 RX ORDER — BUPIVACAINE HYDROCHLORIDE 2.5 MG/ML
INJECTION, SOLUTION EPIDURAL; INFILTRATION; INTRACAUDAL
Status: COMPLETED | OUTPATIENT
Start: 2023-05-20 | End: 2023-05-20

## 2023-05-20 RX ORDER — MISOPROSTOL 200 UG/1
800 TABLET ORAL
Status: DISCONTINUED | OUTPATIENT
Start: 2023-05-20 | End: 2023-05-23 | Stop reason: HOSPADM

## 2023-05-20 RX ORDER — ACETAMINOPHEN 325 MG/1
975 TABLET ORAL EVERY 6 HOURS
Status: DISCONTINUED | OUTPATIENT
Start: 2023-05-20 | End: 2023-05-20

## 2023-05-20 RX ORDER — OXYTOCIN/0.9 % SODIUM CHLORIDE 30/500 ML
PLASTIC BAG, INJECTION (ML) INTRAVENOUS CONTINUOUS PRN
Status: DISCONTINUED | OUTPATIENT
Start: 2023-05-20 | End: 2023-05-20

## 2023-05-20 RX ORDER — MODIFIED LANOLIN
OINTMENT (GRAM) TOPICAL
Status: DISCONTINUED | OUTPATIENT
Start: 2023-05-20 | End: 2023-05-23 | Stop reason: HOSPADM

## 2023-05-20 RX ORDER — OXYTOCIN/0.9 % SODIUM CHLORIDE 30/500 ML
100-340 PLASTIC BAG, INJECTION (ML) INTRAVENOUS CONTINUOUS PRN
Status: DISCONTINUED | OUTPATIENT
Start: 2023-05-20 | End: 2023-05-21

## 2023-05-20 RX ORDER — BISACODYL 10 MG
10 SUPPOSITORY, RECTAL RECTAL DAILY PRN
Status: DISCONTINUED | OUTPATIENT
Start: 2023-05-22 | End: 2023-05-23 | Stop reason: HOSPADM

## 2023-05-20 RX ORDER — METOCLOPRAMIDE HYDROCHLORIDE 5 MG/ML
10 INJECTION INTRAMUSCULAR; INTRAVENOUS EVERY 6 HOURS PRN
Status: DISCONTINUED | OUTPATIENT
Start: 2023-05-20 | End: 2023-05-23 | Stop reason: HOSPADM

## 2023-05-20 RX ORDER — SODIUM CHLORIDE, SODIUM LACTATE, POTASSIUM CHLORIDE, CALCIUM CHLORIDE 600; 310; 30; 20 MG/100ML; MG/100ML; MG/100ML; MG/100ML
INJECTION, SOLUTION INTRAVENOUS CONTINUOUS PRN
Status: DISCONTINUED | OUTPATIENT
Start: 2023-05-20 | End: 2023-05-20

## 2023-05-20 RX ORDER — PROCHLORPERAZINE 25 MG
25 SUPPOSITORY, RECTAL RECTAL EVERY 12 HOURS PRN
Status: DISCONTINUED | OUTPATIENT
Start: 2023-05-20 | End: 2023-05-23 | Stop reason: HOSPADM

## 2023-05-20 RX ADMIN — Medication 300 ML/HR: at 09:31

## 2023-05-20 RX ADMIN — ACETAMINOPHEN 975 MG: 325 TABLET ORAL at 20:59

## 2023-05-20 RX ADMIN — SODIUM CHLORIDE, POTASSIUM CHLORIDE, SODIUM LACTATE AND CALCIUM CHLORIDE: 600; 310; 30; 20 INJECTION, SOLUTION INTRAVENOUS at 08:34

## 2023-05-20 RX ADMIN — PHENYLEPHRINE HYDROCHLORIDE 0.5 MCG/KG/MIN: 10 INJECTION INTRAVENOUS at 09:09

## 2023-05-20 RX ADMIN — FENTANYL CITRATE 15 MCG: 50 INJECTION, SOLUTION INTRAMUSCULAR; INTRAVENOUS at 09:22

## 2023-05-20 RX ADMIN — Medication 0.15 MG: at 09:22

## 2023-05-20 RX ADMIN — BUPIVACAINE HYDROCHLORIDE IN DEXTROSE 1.6 ML: 7.5 INJECTION, SOLUTION SUBARACHNOID at 09:22

## 2023-05-20 RX ADMIN — KETOROLAC TROMETHAMINE 30 MG: 30 INJECTION, SOLUTION INTRAMUSCULAR; INTRAVENOUS at 16:11

## 2023-05-20 RX ADMIN — BUPIVACAINE 20 ML: 13.3 INJECTION, SUSPENSION, LIPOSOMAL INFILTRATION at 09:51

## 2023-05-20 RX ADMIN — ACETAMINOPHEN 975 MG: 325 TABLET ORAL at 08:55

## 2023-05-20 RX ADMIN — BUPIVACAINE HYDROCHLORIDE 20 ML: 2.5 INJECTION, SOLUTION EPIDURAL; INFILTRATION; INTRACAUDAL at 09:51

## 2023-05-20 RX ADMIN — Medication 2 G: at 09:07

## 2023-05-20 RX ADMIN — SENNOSIDES AND DOCUSATE SODIUM 1 TABLET: 50; 8.6 TABLET ORAL at 22:23

## 2023-05-20 RX ADMIN — SENNOSIDES AND DOCUSATE SODIUM 1 TABLET: 50; 8.6 TABLET ORAL at 16:18

## 2023-05-20 RX ADMIN — ONDANSETRON 4 MG: 2 INJECTION INTRAMUSCULAR; INTRAVENOUS at 09:15

## 2023-05-20 RX ADMIN — ACETAMINOPHEN 975 MG: 325 TABLET ORAL at 15:11

## 2023-05-20 RX ADMIN — KETOROLAC TROMETHAMINE 15 MG: 30 INJECTION, SOLUTION INTRAMUSCULAR at 09:50

## 2023-05-20 RX ADMIN — KETOROLAC TROMETHAMINE 30 MG: 30 INJECTION, SOLUTION INTRAMUSCULAR; INTRAVENOUS at 22:23

## 2023-05-20 RX ADMIN — SODIUM CHLORIDE, POTASSIUM CHLORIDE, SODIUM LACTATE AND CALCIUM CHLORIDE: 600; 310; 30; 20 INJECTION, SOLUTION INTRAVENOUS at 09:28

## 2023-05-20 RX ADMIN — TRANEXAMIC ACID 1 G: 1 INJECTION, SOLUTION INTRAVENOUS at 09:32

## 2023-05-20 ASSESSMENT — ACTIVITIES OF DAILY LIVING (ADL)
ADLS_ACUITY_SCORE: 18
ADLS_ACUITY_SCORE: 21
ADLS_ACUITY_SCORE: 18
ADLS_ACUITY_SCORE: 18
ADLS_ACUITY_SCORE: 21
ADLS_ACUITY_SCORE: 21

## 2023-05-20 NOTE — PROGRESS NOTES
RN notified Dr. Iyer of Cat 1 tracings for both baby A and Baby B. Per provider, remove EFM monitors overnight and obtain fetal wellbeing tracing at 06:00 prior to procedure. Orders obtained for hydroxyzine 50 mg oral and morphine 10 mg IM for sleep.     Shayna updated on POC and in agreement. Following monitor removal and VS check, pt up to bathroom to void and shower with anti-microbial soap.

## 2023-05-20 NOTE — ANESTHESIA CARE TRANSFER NOTE
Patient: Shayna Christy    Procedure: Procedure(s):   SECTION, WITH BILATERAL SALPINGECTOMY       Diagnosis: Dichorionic diamniotic twin pregnancy in third trimester [O30.043]  Diagnosis Additional Information: No value filed.    Anesthesia Type:   Spinal     Note:    Oropharynx: oropharynx clear of all foreign objects  Level of Consciousness: awake  Oxygen Supplementation: room air    Independent Airway: airway patency satisfactory and stable  Dentition: dentition unchanged  Vital Signs Stable: post-procedure vital signs reviewed and stable  Report to RN Given: handoff report given  Patient transferred to: Labor and Delivery    Handoff Report: Identifed the Patient, Identified the Reponsible Provider, Reviewed the pertinent medical history, Discussed the surgical course, Reviewed Intra-OP anesthesia mangement and issues during anesthesia, Set expectations for post-procedure period and Allowed opportunity for questions and acknowledgement of understanding      Vitals:  Vitals Value Taken Time   /64 23 1008   Temp     Pulse 68 23 1008   Resp 16 23 1008   SpO2 100 % 23 1008       Electronically Signed By: ELOY Meneses CRNA  May 20, 2023  10:09 AM

## 2023-05-20 NOTE — ANESTHESIA PROCEDURE NOTES
"Intrathecal injection Procedure Note    Pre-Procedure   Staff -        Anesthesiologist:  Latha Correa MD       Performed By: anesthesiologist       Location: OR       Procedure Start/Stop Times: 5/20/2023 9:22 AM and 5/20/2023 9:22 AM       Pre-Anesthestic Checklist: patient identified, IV checked, risks and benefits discussed, informed consent, monitors and equipment checked, pre-op evaluation, at physician/surgeon's request and post-op pain management  Timeout:       Correct Patient: Yes        Correct Procedure: Yes        Correct Site: Yes        Correct Position: Yes   Procedure Documentation  Procedure: intrathecal injection       Patient Position: sitting       Patient Prep/Sterile Barriers: sterile gloves, mask, patient draped       Skin prep: Chloraprep       Insertion Site: L3-4. (midline approach).       Needle Gauge: 25.        Needle Length (Inches): 3.5        Spinal Needle Type: Pencan       Introducer used       Introducer: 20 G       # of attempts: 1 and  # of redirects:  0    Assessment/Narrative         Paresthesias: No.       Sensory Level: T5       CSF fluid: clear.       Opening pressure was cmH2O while  Sitting.      Medication(s) Administered   0.75% Hyperbaric Bupivacaine (Intrathecal) - Intrathecal   1.6 mL - 5/20/2023 9:22:00 AM  Fentanyl PF (Intrathecal) - Intrathecal   15 mcg - 5/20/2023 9:22:00 AM  Morphine PF 1 mg/mL (Intrathecal) - Intrathecal   0.15 mg - 5/20/2023 9:22:00 AM  Medication Administration Time: 5/20/2023 9:22 AM      FOR [facility] ONLY:   Pain Team Contact information: please page the Pain Team Via [employer]. Search \"Pain\". During daytime hours, please page the attending first. At night please page the resident first.      "

## 2023-05-20 NOTE — PROGRESS NOTES
OB NOTE    No change in cervix  Unable to go above 9 international unit(s) Pit secondary to intolerance of Baby B  Pt ate at 6pm  Discussed with patient  Informed consent for CS given  Plan at 8am    Devon Iyer MD

## 2023-05-20 NOTE — PLAN OF CARE
Problem: Plan of Care - These are the overarching goals to be used throughout the patient stay.    Goal: Plan of Care Review  Description: The Plan of Care Review/Shift note should be completed every shift.  The Outcome Evaluation is a brief statement about your assessment that the patient is improving, declining, or no change.  This information will be displayed automatically on your shift note.  Outcome: Progressing   Goal Outcome Evaluation:  Assumed care after report from Shauna ASCENCIO Vss, flow small, pain 0, ff down 2, iv patent and infusing, saline flushed at completion of bag. Legs beginning to move on demand. Fermin catheter remains indewelling, urine output 400 ml danae, taking po fluids well without nausia. Dressing dry and intact, will remain in place x 7 days. HGBP at 1045 and 1345. Pumped milk carried to nicu by dad. Patient settled to rest. Plan to get up to ambulated and visit NICU this afternoon. Scheduled po tylenol at 1500.

## 2023-05-20 NOTE — ANESTHESIA PREPROCEDURE EVALUATION
Anesthesia Pre-Procedure Evaluation    Patient: Shayna Christy   MRN: [id number] : 1990        Procedure : Procedure(s):   SECTION, WITH BILATERAL SALPINGECTOMY          Past Medical History:   Diagnosis Date     Diabetes (H)       Past Surgical History:   Procedure Laterality Date     WISDOM TOOTH EXTRACTION        Allergies   Allergen Reactions     Blood-Group Specific Substance Other (See Comments)     Patient has an anti-Fyb and Anti-E. Blood product orders may be delayed. Draw two purple top tubes and one red top tube for all blood bank orders.     Diphtheria-Tetanus-Pertuss Vac [Diphth-Tet Tox-Pertus Vac] Other (See Comments)     Patient states allergy from D-tap vaccine      Other Allergy (See Comments) [External Allergen Needs Reconciliation - See Comment] Other (See Comments)     Patient states allergy from D-tap vaccine , DTaP, 2013.       Pertussis Vaccines [Pertussis Vaccine] Other (See Comments)     Tetanus And Diphther. Tox (Pf) [Tetanus-Diphtheria Toxoids Td] Other (See Comments)      Social History     Tobacco Use     Smoking status: Never     Smokeless tobacco: Never   Vaping Use     Vaping status: Not on file   Substance Use Topics     Alcohol use: No     Alcohol/week: 0.8 standard drinks of alcohol     Comment: Alcoholic Drinks/day: occ      Wt Readings from Last 1 Encounters:   23 65.8 kg (145 lb)        Anesthesia Evaluation   Pt has had prior anesthetic. Type: OB Labor Epidural.    No history of anesthetic complications       ROS/MED HX  ENT/Pulmonary:     (+) Intermittent, asthma (reactive airway disease, wheezing with URIs requiring albuterol)     Neurologic:  - neg neurologic ROS     Cardiovascular: Comment: Low baseline blood pressure   (-) PIH   METS/Exercise Tolerance:     Hematologic: Comments: Known antibodies - 2 crossed units available      (+) no anticoagulation therapy, no coagulopathy, no thrombocytopenia,  (-) anemia (Hgb 11.4)   Musculoskeletal:        GI/Hepatic:  - neg GI/hepatic ROS  (-) GERD   Renal/Genitourinary:       Endo:     (+) thyroid problem, hypothyroidism,     Psychiatric/Substance Use:  - neg psychiatric ROS     Infectious Disease:       Malignancy:       Other:      (+) , twin IUP (@ 34w2d, IUGR, baby B vertex/transverse - s/f LTCS),  (-) previous  and placenta previa       Physical Exam    Airway        Mallampati: II   TM distance: > 3 FB   Neck ROM: full   Mouth opening: > 3 cm    Respiratory Devices and Support         Dental           Cardiovascular             Pulmonary                   OUTSIDE LABS:  CBC:   Lab Results   Component Value Date    WBC 8.3 2023    WBC 3.8 (L) 2019    HGB 11.7 2023    HGB 12.3 2019    HCT 34.9 (L) 2023    HCT 35.8 2019     2023     2019     BMP:   Lab Results   Component Value Date     (H) 2023     (H) 2023     COAGS: No results found for: PTT, INR, FIBR  POC: No results found for: BGM, HCG, HCGS  HEPATIC: No results found for: ALBUMIN, PROTTOTAL, ALT, AST, GGT, ALKPHOS, BILITOTAL, BILIDIRECT, GUMARO  OTHER:   Lab Results   Component Value Date    TSH 1.81 2023    T4 0.71 (L) 2023       Anesthesia Plan    ASA Status:  3      Anesthesia Type: Spinal.              Consents            Postoperative Care            Comments:    Other Comments: SAB - fentanyl 15mcg, duramorph 150mcg  Phenylephrine gtt  zofran 4 mg pre sab  Tap block post procedure per surgeon request - latesha Wheeler MD

## 2023-05-20 NOTE — PLAN OF CARE
VSS, afebrile. No contractions felt overnight per pt. Baby A has moderate variability with accels, baseline WNL. Baby B has been difficult to trace, moderate variability, baseline WNL. 2nd PIV placed in left arm with pt consent d/t high PPH risk. C/S supplies in room.     Goal Outcome Evaluation:      Plan of Care Reviewed With: patient, spouse    Overall Patient Progress: improvingOverall Patient Progress: improving

## 2023-05-20 NOTE — ANESTHESIA PROCEDURE NOTES
TAP Procedure Note    Pre-Procedure   Staff -        Anesthesiologist:  Latha Correa MD       Performed By: anesthesiologist       Location: OR       Procedure Start/Stop Times: 5/20/2023 9:51 AM and 5/20/2023 9:56 AM       Pre-Anesthestic Checklist: patient identified, IV checked, site marked, risks and benefits discussed, informed consent, monitors and equipment checked, pre-op evaluation, at physician/surgeon's request and post-op pain management  Timeout:       Correct Patient: Yes        Correct Procedure: Yes        Correct Site: Yes        Correct Position: Yes        Correct Laterality: Yes        Site Marked: Yes  Procedure Documentation  Procedure: TAP       Laterality: bilateral       Patient Position: supine       Patient Prep/Sterile Barriers: sterile gloves, mask       Skin prep: Chloraprep       Needle type: Stimuplex.       Needle Gauge: 21.        Needle Length (Inches): 4        Ultrasound guided       1. Ultrasound was used to identify targeted nerve, plexus, vascular marker, or fascial plane and place a needle adjacent to it in real-time.       2. Ultrasound was used to visualize the spread of anesthetic in close proximity to the above referenced structure.       3. A permanent image is entered into the patient's record.       4. The visualized anatomic structures appeared normal.       5. There were no apparent abnormal pathologic findings.    Assessment/Narrative         The placement was negative for: blood aspirated and site bleedingInjection painful: Sedated.       Paresthesias: No.       Bolus given via needle. no blood aspirated via catheter.        Secured via.        Insertion/Infusion Method: Single Shot       Complications: none       Injection made incrementally with aspirations every 5 mL.    Medication(s) Administered   Bupivacaine 0.25% PF (Infiltration) - Infiltration   20 mL - 5/20/2023 9:51:00 AM  Bupivacaine liposome (Exparel) 1.3% LA inj susp (Infiltration) -  "Infiltration   20 mL - 5/20/2023 9:51:00 AM  Medication Administration Time: 5/20/2023 9:51 AM      FOR [facility] ([facility]) ONLY:   Pain Team Contact information: please page the Pain Team Via [employer]. Search \"Pain\". During daytime hours, please page the attending first. At night please page the resident first.      "

## 2023-05-20 NOTE — OP NOTE
SECTION WITH BILATERAL TUBAL LIGATION - OP NOTE    DATE OF SERVICE: 2023    PREOPERATIVE DIAGNOSIS: Intrauterine pregnancy at 34w2d and desires permanent sterilization, twins, IUGR and fetal intolerance in baby B, Isoimmunization    POSTOPERATIVE DIAGNOSIS: Same plus delivery of a viable infants, Baby A, a female, baby B, a male    PROCEDURE: Low Transverse  Section with Bilateral Tubal Ligation    SURGEON(S): Devon Iyer MD    ASSISTANTS: OR Staff    ANESTHESIA: spinal    ESTIMATED BLOOD LOSS: 932cc    DRAINS: Fermin    SPECIMENS: Portions of bilateral tubes    COMPLICATIONS: None.     HISTORY OF PRESENT ILLNESS:  This is an 33 year old y.o.  female. The risks, benefits and alternatives to the procedure were discussed with her. A failure rate of 1/200, irreversibility and increased risk of an ectopic with tubal ligation. All of her questions were answered to her satisfaction. She expressed understanding and wished to proceed.     INDICATION: Twins, IUGR. Breech baby B, fetal intolerance, multiparity - desires permanent sterilization    Procedure note:  Patient was brought to the operating room and was prepped and draped in a normal sterile fashion.  A time out was called and the patient and the procedure were verified.  A Pfannenstiel incision was made and carried down to the fascia.  The fascia was incised in the midline and extended with the Garcia scissors.  Rectus muscles were then  in the midline.  Peritoneum was then identified and entered.  The lower uterine segment was incised in a transverse fashion with the scapal.  Clear fluid was encountered times two.  A viable infant twin A, vertex,  was delivered and passed off to the ICU staff. Baby B was delivered from the breech atraumatically and also passed safely to the NICU staff. Placentas were  removed manually.  The uterus was exteriorized and cleared of all clot and debris.  The uterine incision was repaired  with 0-vicryl in a running fashion.  Good hemostasis was noted.  The gutters were cleared of all clot and debris    The right fallopian tube was identified and followed out to the fimbriated end. A ten MM Ligasure was used to come across the mesosalpinx to the isthmic-ampullary junction.  The left tube was done in similar fashion.  Rectus muscles were approximated.  Fascia was closed with 0-vicryl in running fashion.  Skin was closed with staples.  The patient tolerated the procedure well.  Sponge, lap, and needle counts were correct x2.  Infant and mother were transferred to recovery area in stable condition.    Devon Iyer MD    CC1: Devon Iyer MD

## 2023-05-21 LAB
GLUCOSE BLDC GLUCOMTR-MCNC: 93 MG/DL (ref 70–99)
HGB BLD-MCNC: 11 G/DL (ref 11.7–15.7)

## 2023-05-21 PROCEDURE — 120N000001 HC R&B MED SURG/OB

## 2023-05-21 PROCEDURE — 250N000013 HC RX MED GY IP 250 OP 250 PS 637: Performed by: OBSTETRICS & GYNECOLOGY

## 2023-05-21 PROCEDURE — 36415 COLL VENOUS BLD VENIPUNCTURE: CPT | Performed by: OBSTETRICS & GYNECOLOGY

## 2023-05-21 PROCEDURE — 85018 HEMOGLOBIN: CPT | Performed by: OBSTETRICS & GYNECOLOGY

## 2023-05-21 PROCEDURE — 250N000011 HC RX IP 250 OP 636: Performed by: OBSTETRICS & GYNECOLOGY

## 2023-05-21 RX ADMIN — ACETAMINOPHEN 975 MG: 325 TABLET ORAL at 22:11

## 2023-05-21 RX ADMIN — IBUPROFEN 800 MG: 800 TABLET ORAL at 22:11

## 2023-05-21 RX ADMIN — SENNOSIDES AND DOCUSATE SODIUM 2 TABLET: 50; 8.6 TABLET ORAL at 21:20

## 2023-05-21 RX ADMIN — ACETAMINOPHEN 975 MG: 325 TABLET ORAL at 15:59

## 2023-05-21 RX ADMIN — KETOROLAC TROMETHAMINE 30 MG: 30 INJECTION, SOLUTION INTRAMUSCULAR; INTRAVENOUS at 04:14

## 2023-05-21 RX ADMIN — ACETAMINOPHEN 975 MG: 325 TABLET ORAL at 10:08

## 2023-05-21 RX ADMIN — SENNOSIDES AND DOCUSATE SODIUM 1 TABLET: 50; 8.6 TABLET ORAL at 10:08

## 2023-05-21 RX ADMIN — OXYCODONE HYDROCHLORIDE 5 MG: 5 TABLET ORAL at 21:20

## 2023-05-21 RX ADMIN — OXYCODONE HYDROCHLORIDE 5 MG: 5 TABLET ORAL at 17:09

## 2023-05-21 RX ADMIN — IBUPROFEN 800 MG: 800 TABLET ORAL at 15:58

## 2023-05-21 RX ADMIN — ACETAMINOPHEN 975 MG: 325 TABLET ORAL at 03:24

## 2023-05-21 RX ADMIN — IBUPROFEN 800 MG: 800 TABLET ORAL at 10:08

## 2023-05-21 ASSESSMENT — ACTIVITIES OF DAILY LIVING (ADL)
ADLS_ACUITY_SCORE: 18

## 2023-05-21 NOTE — PLAN OF CARE
Goal Outcome Evaluation: Progressing     Shayna's VSS.  FFU/1-3, light rubra lochia, a few small clots.   She's rated her pain 1/10; being given scheduled Tylenol and Ibuprofen.      Problem: Postpartum ( Delivery)  Goal: Successful Maternal Role Transition  Outcome: Progressing   Shayna's been resting this evening after going to see and hold her babies in the NICU for 2 hours earlier in the shift.  She is ambulating independently, accompanied by RN or her .  Shayna's pumping every 3 hours, getting small amounts of colostrum and taking to babies in NICU.  Shayna's catheter removed at ; at time of this writing, awaiting first void since then.    Problem: Postpartum ( Delivery)  Goal: Absence of Infection Signs and Symptoms  Outcome: Progressing     Problem: Postpartum ( Delivery)  Goal: Hemostasis  Outcome: Progressing

## 2023-05-21 NOTE — PROGRESS NOTES
Progress Note    Doing well. Pain controlled. Ambulating. Voiding. Passing flatus. Normal lochia.     Temp:  [97.6  F (36.4  C)-98.7  F (37.1  C)] 98.7  F (37.1  C)  Pulse:  [68-94] 94  Resp:  [14-16] 14  BP: ()/(50-64) 89/55  SpO2:  [92 %-100 %] 98 %    NAD, AAO x 3  Abdomen soft, non tender  Dressing clean and dry  No c/c/e    Hgb: 11.3 --> 11    A/P: 32 yo POD #1 s/p primary C/S with bilateral salpingectomy 2/2 intolerance of twin B  -- Routine post op course    Bridget Cortez MD, FACOG  (P) [phone]

## 2023-05-21 NOTE — PLAN OF CARE
"  Problem: Postpartum ( Delivery)  Goal: Optimal Pain Control and Function  Outcome: Progressing  Intervention: Prevent or Manage Pain  Recent Flowsheet Documentation  Taken 2023 1010 by Leeanne Mcallister, RN  Pain Management Interventions: medication (see MAR)   Goal Outcome Evaluation:       OB checks WNL and VSS. Pt voiding and ambulating independently. Taking Ibuprofen and Tylenol for pain management. Pt pumping \"drops.\" Pt ambulating down to SCN to visit babies. Hgb 11.0 today, down from 11.3.                 "

## 2023-05-21 NOTE — ANESTHESIA POSTPROCEDURE EVALUATION
"Patient: Shayna Christy    Procedure: Procedure(s):   SECTION, WITH BILATERAL SALPINGECTOMY       Anesthesia Type:  Spinal    Note:  Disposition: Inpatient   Postop Pain Control: Uneventful            Sign Out: Well controlled pain   PONV: No   Neuro/Psych: Uneventful            Sign Out: Acceptable/Baseline neuro status   Airway/Respiratory: Uneventful            Sign Out: Acceptable/Baseline resp. status   CV/Hemodynamics: Uneventful            Sign Out: Acceptable CV status; No obvious hypovolemia; No obvious fluid overload   Other NRE: NONE   DID A NON-ROUTINE EVENT OCCUR? No    Event details/Postop Comments:    Shayna Christy status post spinal anesthetic for delivery. Satisfied with anesthetic care.    BP (!) 89/55 (BP Location: Right arm, Patient Position: Semi-Donis's, Cuff Size: Adult Small)   Pulse 94   Temp 37.1  C (98.7  F) (Oral)   Resp 14   Ht 1.689 m (5' 6.5\")   Wt 65.8 kg (145 lb)   SpO2 98%   Breastfeeding Unknown   BMI 23.05 kg/m        Denies headache. Neurologically intact. No apparent anesthetic complications. No follow up indicated.                Last vitals:  Vitals:    23 0414 23 0500 23 0600   BP: (!) 89/55     Pulse: 77 78 94   Resp: 14 14    Temp: 37.1  C (98.7  F)     SpO2: 99% 98% 98%       Electronically Signed By: Chaz Mercado MD  May 21, 2023  7:24 AM  "

## 2023-05-21 NOTE — PLAN OF CARE
Patient is managing pain with scheduled Ibuprofen and Tylenol.   Postpartum assessment WNL. Incisional dressing CDI. Voiding.   Pumping and walks to the NICU to visit babies. Slept well overnight.   Still needs to complete mood assessment and birth certificate.     Problem: Postpartum ( Delivery)  Goal: Hemostasis  Outcome: Progressing  Goal: Effective Bowel Elimination  Outcome: Progressing  Goal: Anesthesia/Sedation Recovery  Outcome: Progressing  Goal: Optimal Pain Control and Function  Outcome: Progressing  Intervention: Prevent or Manage Pain  Recent Flowsheet Documentation  Taken 2023 by Shayna Gutiérrez RN  Pain Management Interventions:   medication (see MAR)   repositioned   rest     Problem: Breastfeeding  Goal: Effective Breastfeeding  Outcome: Progressing     Problem: Plan of Care - These are the overarching goals to be used throughout the patient stay.    Goal: Absence of Hospital-Acquired Illness or Injury  Intervention: Prevent Skin Injury  Recent Flowsheet Documentation  Taken 2023 by Shayna Gutiérrez RN  Body Position: position changed independently  Goal: Optimal Comfort and Wellbeing  Intervention: Monitor Pain and Promote Comfort  Recent Flowsheet Documentation  Taken 2023 by Shayna Gutiérrez RN  Pain Management Interventions:   medication (see MAR)   repositioned   rest  Intervention: Provide Person-Centered Care  Recent Flowsheet Documentation  Taken 2023 by Shayna Gutiérrez RN  Trust Relationship/Rapport:   care explained   questions answered   choices provided   emotional support provided   empathic listening provided   questions encouraged   reassurance provided   thoughts/feelings acknowledged     Problem: Labor  Goal: Stable Fetal Wellbeing  Intervention: Promote and Monitor Fetal Wellbeing  Recent Flowsheet Documentation  Taken 2023 by Shayna Gutiérrez RN  Body Position: position changed independently   Goal Outcome Evaluation:

## 2023-05-22 PROCEDURE — 120N000001 HC R&B MED SURG/OB

## 2023-05-22 PROCEDURE — 250N000013 HC RX MED GY IP 250 OP 250 PS 637: Performed by: OBSTETRICS & GYNECOLOGY

## 2023-05-22 RX ADMIN — SENNOSIDES AND DOCUSATE SODIUM 1 TABLET: 50; 8.6 TABLET ORAL at 11:20

## 2023-05-22 RX ADMIN — OXYCODONE HYDROCHLORIDE 5 MG: 5 TABLET ORAL at 03:43

## 2023-05-22 RX ADMIN — IBUPROFEN 800 MG: 800 TABLET ORAL at 11:21

## 2023-05-22 RX ADMIN — OXYCODONE HYDROCHLORIDE 5 MG: 5 TABLET ORAL at 20:56

## 2023-05-22 RX ADMIN — IBUPROFEN 800 MG: 800 TABLET ORAL at 18:35

## 2023-05-22 RX ADMIN — ACETAMINOPHEN 975 MG: 325 TABLET ORAL at 18:34

## 2023-05-22 RX ADMIN — SENNOSIDES AND DOCUSATE SODIUM 2 TABLET: 50; 8.6 TABLET ORAL at 20:56

## 2023-05-22 RX ADMIN — IBUPROFEN 800 MG: 800 TABLET ORAL at 03:43

## 2023-05-22 RX ADMIN — OXYCODONE HYDROCHLORIDE 5 MG: 5 TABLET ORAL at 15:54

## 2023-05-22 RX ADMIN — OXYCODONE HYDROCHLORIDE 5 MG: 5 TABLET ORAL at 11:21

## 2023-05-22 RX ADMIN — ACETAMINOPHEN 975 MG: 325 TABLET ORAL at 11:21

## 2023-05-22 RX ADMIN — ACETAMINOPHEN 975 MG: 325 TABLET ORAL at 03:43

## 2023-05-22 ASSESSMENT — ACTIVITIES OF DAILY LIVING (ADL)
ADLS_ACUITY_SCORE: 18

## 2023-05-22 NOTE — LACTATION NOTE
Lactation consultant to patient room to discuss pumping and breast milk making. Mom states she breast fed her first three children, now 6, 9, and 11 years old. Has been pumping 8x a day, but states she is not getting much yet. Reassured her that milk making may take a little longer due to surgery and prematurity. Recommended renting hospital grade pump to help stimulate and sustain milk supply while babies are in NICU. Will rent on day of discharge.    Reviewed lactation consultant services and availability, offered support and encouragement.

## 2023-05-22 NOTE — CARE PLAN
Report from evening nurse was that patient requests that assessments be pushed back until pain meds due this shift to allow rest.

## 2023-05-22 NOTE — PLAN OF CARE
Patient is managing pain with Ibuprofen, Tylenol and Ibuprofen.  Postpartum assessment WNL.   Pumping and visiting babies in NICU with .   Slight edema to lower extremities.   Tdap up to date.   Still needs to complete mood assessment and birth certificate.     Problem: Postpartum ( Delivery)  Goal: Hemostasis  Outcome: Progressing  Goal: Effective Bowel Elimination  Outcome: Progressing  Goal: Optimal Pain Control and Function  Outcome: Progressing  Intervention: Prevent or Manage Pain  Recent Flowsheet Documentation  Taken 2023 by Shayna Gutiérrez RN  Pain Management Interventions:   medication (see MAR)   rest   repositioned  Perineal Care:   absorbent brief/pad changed   perineal hygiene encouraged   perineal spray bottle/warm water use encouraged     Problem: Breastfeeding  Goal: Effective Breastfeeding  Outcome: Progressing     Problem: Plan of Care - These are the overarching goals to be used throughout the patient stay.    Goal: Absence of Hospital-Acquired Illness or Injury  Intervention: Prevent Skin Injury  Recent Flowsheet Documentation  Taken 2023 by Shayna Gutiérrez RN  Body Position: position changed independently  Goal: Optimal Comfort and Wellbeing  Intervention: Monitor Pain and Promote Comfort  Recent Flowsheet Documentation  Taken 2023 by Shayna Gutiérrez RN  Pain Management Interventions:   medication (see MAR)   rest   repositioned  Intervention: Provide Person-Centered Care  Recent Flowsheet Documentation  Taken 2023 by Shayna Gutiérrez RN  Trust Relationship/Rapport:   care explained   choices provided   empathic listening provided   questions answered   questions encouraged   reassurance provided   thoughts/feelings acknowledged   emotional support provided     Problem: Labor  Goal: Stable Fetal Wellbeing  Intervention: Promote and Monitor Fetal Wellbeing  Recent Flowsheet Documentation  Taken 2023 by Shayna Gutiérrez RN  Body Position: position  changed independently

## 2023-05-22 NOTE — PLAN OF CARE
Patient is managing pain with Ibuprofen, oxycodone, and Tylenol.   Postpartum assessment WNL.   Patient is hopeful for discharge to home Wednesday morning.  Breast pumping, producing drops of colostrum.  Still needs to complete mood assessment and birth certificate.   Goal: Absence of Hospital-Acquired Illness or Injury  Outcome: Met  Intervention: Prevent Skin Injury  Recent Flowsheet Documentation  Taken 2023 1130 by Janki Lackey RN  Body Position: position changed independently  Goal: Optimal Comfort and Wellbeing  Outcome: Progressing  Intervention: Monitor Pain and Promote Comfort  Recent Flowsheet Documentation  Taken 2023 1121 by Janki Lackey RN  Pain Management Interventions: medication (see MAR)  Intervention: Provide Person-Centered Care  Recent Flowsheet Documentation  Taken 2023 1130 by Janki Lackey RN  Trust Relationship/Rapport:    care explained    emotional support provided    questions answered    questions encouraged    reassurance provided  Goal: Readiness for Transition of Care  Outcome: Progressing     Problem: Labor  Goal: Stable Fetal Wellbeing  Intervention: Promote and Monitor Fetal Wellbeing  Recent Flowsheet Documentation  Taken 2023 1130 by Janki Lackey RN  Body Position: position changed independently     Problem: Postpartum ( Delivery)  Goal: Hemostasis  Outcome: Progressing  Goal: Effective Bowel Elimination  Outcome: Progressing  Goal: Optimal Pain Control and Function  Outcome: Progressing  Intervention: Prevent or Manage Pain  Recent Flowsheet Documentation  Taken 2023 1130 by Janki Lackey RN  Perineal Care: perineal hygiene encouraged  Taken 2023 1121 by Janki Lackey RN  Pain Management Interventions: medication (see MAR)  Goal: Effective Oxygenation and Ventilation  Intervention: Optimize Oxygenation and Ventilation  Recent Flowsheet Documentation  Taken 2023 1130 by  Janki Lackey, RN  Head of Bed (HOB) Positioning: HOB at 20-30 degrees     Problem: Breastfeeding  Goal: Effective Breastfeeding  Outcome: Progressing  Intervention: Promote Breast Care and Comfort  Recent Flowsheet Documentation  Taken 5/22/2023 1130 by Janki Lackey, RN  Breast Care: double electric breast pump utilized   Goal Outcome Evaluation:

## 2023-05-22 NOTE — PLAN OF CARE
Goal Outcome Evaluation: Progressing     Shayna's VS remain stable.  She's ambulating to the NICU to see her babies; voiding without difficulty.  FFU/2; scant serosa lochia, no clots.  Shayna is pumping; not getting anything.  Says her milk has come in on day 3-4 with her other babies; educated that it may be a day or so later due to her  this time.  She's experiencing more pain this evening and has been taking Oxy as well as scheduled Tylenol and Ibuprofen.  She's rated her pain 4-5/10 this shift.  Her , children and mother were her earlier this evening.  She is alone now tonight.

## 2023-05-22 NOTE — PROGRESS NOTES
"Csection - Post operative day 2    ASSESSMENT: PLAN:   POD#2    S/P csection for  fetal intolerance to labor twin B  TWIN gestation  S/p bilateral tubal ligation (BTL)  Infants in NICU     Continue routine cares   aniticipate discharge in am    SUBJECTIVE:    The patient feels well: Catheter is out, bleeding decreased, tolerating normal diet, and passing flatus.  Pain is well controlled. The patient has no emotional concerns.  The baby is well and being fed    OBJECTIVE:  BP 90/55 (BP Location: Right arm, Patient Position: Semi-Donis's, Cuff Size: Adult Small)   Pulse 90   Temp 98  F (36.7  C) (Oral)   Resp 16   Ht 1.689 m (5' 6.5\")   Wt 65.8 kg (145 lb)   SpO2 99%   Breastfeeding Unknown   BMI 23.05 kg/m      Incision- dressing dry   Ext- nontender      Lab  Hemoglobin   Date Value Ref Range Status   05/21/2023 11.0 (L) 11.7 - 15.7 g/dL Final   ]        Susan Rosario MD  [facility]  [phone]      "

## 2023-05-23 VITALS
OXYGEN SATURATION: 100 % | BODY MASS INDEX: 21.28 KG/M2 | DIASTOLIC BLOOD PRESSURE: 67 MMHG | SYSTOLIC BLOOD PRESSURE: 105 MMHG | WEIGHT: 135.6 LBS | HEIGHT: 67 IN | RESPIRATION RATE: 16 BRPM | TEMPERATURE: 98.5 F | HEART RATE: 86 BPM

## 2023-05-23 PROBLEM — R63.6 UNDERWEIGHT: Status: ACTIVE | Noted: 2017-11-09

## 2023-05-23 PROBLEM — Z98.891 S/P C-SECTION: Status: ACTIVE | Noted: 2023-05-23

## 2023-05-23 PROBLEM — O09.529 AMA (ADVANCED MATERNAL AGE) MULTIGRAVIDA 35+: Status: ACTIVE | Noted: 2023-05-23

## 2023-05-23 LAB
PATH REPORT.COMMENTS IMP SPEC: NORMAL
PATH REPORT.COMMENTS IMP SPEC: NORMAL
PATH REPORT.FINAL DX SPEC: NORMAL
PATH REPORT.GROSS SPEC: NORMAL
PATH REPORT.MICROSCOPIC SPEC OTHER STN: NORMAL
PATH REPORT.RELEVANT HX SPEC: NORMAL
PHOTO IMAGE: NORMAL

## 2023-05-23 PROCEDURE — 250N000013 HC RX MED GY IP 250 OP 250 PS 637: Performed by: OBSTETRICS & GYNECOLOGY

## 2023-05-23 RX ORDER — SENNA AND DOCUSATE SODIUM 50; 8.6 MG/1; MG/1
1 TABLET, FILM COATED ORAL AT BEDTIME
Qty: 60 TABLET | Refills: 0 | Status: SHIPPED | OUTPATIENT
Start: 2023-05-23

## 2023-05-23 RX ORDER — ACETAMINOPHEN 325 MG/1
975 TABLET ORAL EVERY 6 HOURS
Status: DISCONTINUED | OUTPATIENT
Start: 2023-05-23 | End: 2023-05-23 | Stop reason: HOSPADM

## 2023-05-23 RX ORDER — IBUPROFEN 800 MG/1
800 TABLET, FILM COATED ORAL EVERY 6 HOURS
Status: DISCONTINUED | OUTPATIENT
Start: 2023-05-23 | End: 2023-05-23 | Stop reason: HOSPADM

## 2023-05-23 RX ORDER — ACETAMINOPHEN 500 MG
500-1000 TABLET ORAL EVERY 6 HOURS PRN
Qty: 60 TABLET | Refills: 0 | Status: SHIPPED | OUTPATIENT
Start: 2023-05-23

## 2023-05-23 RX ORDER — IBUPROFEN 600 MG/1
600 TABLET, FILM COATED ORAL EVERY 6 HOURS PRN
Qty: 40 TABLET | Refills: 0 | Status: SHIPPED | OUTPATIENT
Start: 2023-05-23

## 2023-05-23 RX ADMIN — IBUPROFEN 800 MG: 800 TABLET ORAL at 12:28

## 2023-05-23 RX ADMIN — SENNOSIDES AND DOCUSATE SODIUM 1 TABLET: 50; 8.6 TABLET ORAL at 10:45

## 2023-05-23 RX ADMIN — IBUPROFEN 800 MG: 800 TABLET ORAL at 00:27

## 2023-05-23 RX ADMIN — OXYCODONE HYDROCHLORIDE 5 MG: 5 TABLET ORAL at 01:08

## 2023-05-23 RX ADMIN — OXYCODONE HYDROCHLORIDE 5 MG: 5 TABLET ORAL at 06:23

## 2023-05-23 RX ADMIN — ACETAMINOPHEN 975 MG: 325 TABLET ORAL at 12:28

## 2023-05-23 RX ADMIN — ACETAMINOPHEN 975 MG: 325 TABLET ORAL at 00:27

## 2023-05-23 RX ADMIN — ACETAMINOPHEN 975 MG: 325 TABLET ORAL at 06:22

## 2023-05-23 RX ADMIN — IBUPROFEN 800 MG: 800 TABLET ORAL at 06:23

## 2023-05-23 ASSESSMENT — ACTIVITIES OF DAILY LIVING (ADL)
ADLS_ACUITY_SCORE: 18

## 2023-05-23 NOTE — DISCHARGE SUMMARY
[facility] Discharge Summary    Shayna Christy MRN# [id number]   Age: 33 year old YOB: 1990     Date of Admission:  2023  Date of Discharge::  2023  Admitting Physician:  Devon Iyer MD  Discharge Physician:  Susan Rosario MD     [facility]            Admission Diagnoses:   Pregnancy  Dichorionic diamniotic twin pregnancy in third trimester [O30.043]  Advanced maternal age  Desires sterilization  Isoimmunization   Intrauterine growth restriction  Encounter for  induction of labor             Discharge Diagnosis:   Same as above    fetal intolerance to labor baby b          Procedures:   Procedure(s): Bilateral tubal ligation  Primary low transverse  section  spontaneous vaginal delivery                    Medications Prior to Admission:     Medications Prior to Admission   Medication Sig Dispense Refill Last Dose     albuterol (PROAIR HFA;PROVENTIL HFA;VENTOLIN HFA) 90 mcg/actuation inhaler [ALBUTEROL (PROAIR HFA;PROVENTIL HFA;VENTOLIN HFA) 90 MCG/ACTUATION INHALER] Inhale 2 puffs every 6 (six) hours as needed for wheezing. 1 each 0 Unknown     Ascorbic Acid (VITAMIN C) 100 MG CHEW    2023     folic acid (FOLVITE) 800 MCG tablet [FOLIC ACID (FOLVITE) 800 MCG TABLET] Take 400 mcg by mouth daily.   2023     LIOTRIX, T3-T4, PO Take by mouth daily T4 70mcg/T3 23 mcg SR 1 tablet daily every am-  last dose        multivitamin therapeutic (THERAGRAN) tablet [MULTIVITAMIN THERAPEUTIC (THERAGRAN) TABLET] Take 1 tablet by mouth daily.   2023     Vitamin D3 (CHOLECALCIFEROL) 25 mcg (1000 units) tablet Take 5,000 mcg by mouth every other day   2023     acetone urine (KETOSTIX) test strip Use 1 strip/day with first morning urine until ketones are negative for 7 days in a row, then use 1 strip/week. 50 strip 3      blood glucose (NO BRAND SPECIFIED) test strip Use to test blood sugar 4 times daily or as directed. To  accompany: Blood Glucose Monitor Brands: per insurance. 200 strip 6      blood glucose monitoring (NO BRAND SPECIFIED) meter device kit Use to test blood sugar 4 times daily or as directed. Preferred blood glucose meter OR supplies to accompany: Blood Glucose Monitor Brands: per insurance. (Patient taking differently: Use to test blood sugar 4 times daily or as directed. Preferred blood glucose meter OR supplies to accompany: Blood Glucose Monitor Brands: per insurance.   Takes bg qid 1 hour after meals) 1 kit 0      DOCOSAHEXANOIC ACID/EPA (FISH OIL ORAL) [DOCOSAHEXANOIC ACID/EPA (FISH OIL ORAL)] Take 1 tablet by mouth daily.        thin (NO BRAND SPECIFIED) lancets Use to test blood sugar 4 times daily or as directed. To accompany: Blood Glucose Monitor Brands: per insurance. 200 each 3      [DISCONTINUED] ARMOUR THYROID 30 mg Tab [ARMOUR THYROID 30 MG TAB] TAKE 3 TABLETS BY MOUTH ONCE DAILY (Patient not taking: Reported on 2023) 270 tablet 2 Not Taking             Discharge Medications:     Current Discharge Medication List      START taking these medications    Details   acetaminophen (TYLENOL) 500 MG tablet Take 1-2 tablets (500-1,000 mg) by mouth every 6 hours as needed  Qty: 60 tablet, Refills: 0    Associated Diagnoses: S/P       ibuprofen (ADVIL/MOTRIN) 600 MG tablet Take 1 tablet (600 mg) by mouth every 6 hours as needed  Qty: 40 tablet, Refills: 0    Associated Diagnoses: S/P       SENNA-docusate sodium (SENNA S) 8.6-50 MG tablet Take 1 tablet by mouth At Bedtime  Qty: 60 tablet, Refills: 0    Associated Diagnoses: S/P          CONTINUE these medications which have NOT CHANGED    Details   albuterol (PROAIR HFA;PROVENTIL HFA;VENTOLIN HFA) 90 mcg/actuation inhaler [ALBUTEROL (PROAIR HFA;PROVENTIL HFA;VENTOLIN HFA) 90 MCG/ACTUATION INHALER] Inhale 2 puffs every 6 (six) hours as needed for wheezing.  Qty: 1 each, Refills: 0    Comments: May substitute the equivalent medication  per insurance preference.  Associated Diagnoses: Persistent cough      Ascorbic Acid (VITAMIN C) 100 MG CHEW       folic acid (FOLVITE) 800 MCG tablet [FOLIC ACID (FOLVITE) 800 MCG TABLET] Take 400 mcg by mouth daily.      LIOTRIX, T3-T4, PO Take by mouth daily T4 70mcg/T3 23 mcg SR 1 tablet daily every am- 5/19 last dose      multivitamin therapeutic (THERAGRAN) tablet [MULTIVITAMIN THERAPEUTIC (THERAGRAN) TABLET] Take 1 tablet by mouth daily.      Vitamin D3 (CHOLECALCIFEROL) 25 mcg (1000 units) tablet Take 5,000 mcg by mouth every other day      acetone urine (KETOSTIX) test strip Use 1 strip/day with first morning urine until ketones are negative for 7 days in a row, then use 1 strip/week.  Qty: 50 strip, Refills: 3    Associated Diagnoses: Gestational diabetes      blood glucose (NO BRAND SPECIFIED) test strip Use to test blood sugar 4 times daily or as directed. To accompany: Blood Glucose Monitor Brands: per insurance.  Qty: 200 strip, Refills: 6    Associated Diagnoses: Gestational diabetes      blood glucose monitoring (NO BRAND SPECIFIED) meter device kit Use to test blood sugar 4 times daily or as directed. Preferred blood glucose meter OR supplies to accompany: Blood Glucose Monitor Brands: per insurance.  Qty: 1 kit, Refills: 0    Associated Diagnoses: Gestational diabetes      DOCOSAHEXANOIC ACID/EPA (FISH OIL ORAL) [DOCOSAHEXANOIC ACID/EPA (FISH OIL ORAL)] Take 1 tablet by mouth daily.      thin (NO BRAND SPECIFIED) lancets Use to test blood sugar 4 times daily or as directed. To accompany: Blood Glucose Monitor Brands: per insurance.  Qty: 200 each, Refills: 3    Associated Diagnoses: Gestational diabetes         STOP taking these medications       ARMOUR THYROID 30 mg Tab Comments:   Reason for Stopping:                     Consultations:   No consultations were requested during this admission          Brief History of Labor or Admission:   The patient was admitted to labor and delivery for  induction secondary to twin pregnancy with one twin with IUGR,  She was given pitocin she progressed through normal labor curve to complete.   She delivered infant A vaginally but B was having significant  fetal intolerance to labor and she delivered by csection.  There were no operative complications.            Hospital Course:   The patient's hospital course was unremarkable.  She recovered as anticipated and experienced no post-operative complications.  On discharge, her pain was well controlled. Vaginal bleeding is similar to peak menstrual flow.  Voiding without difficulty.  Ambulating well and tolerating a normal diet.  No fever or significant wound drainage.  Breastfeeding well.  Infant is stable.  No bowel movement yet.  She was discharged on post-partum day #3.    Post-partum hemoglobin:   Hemoglobin   Date Value Ref Range Status   05/21/2023 11.0 (L) 11.7 - 15.7 g/dL Final             Discharge Instructions and Follow-Up:   Discharge diet: Regular   Discharge activity: No heavy lifting, pushing, pulling for 6 week(s)  No driving or operating machinery while on narcotic analgesics   Discharge follow-up: Follow up with Dr. Iyer  in 1-2 weeks   Wound care: Keep clean and dry           Discharge Disposition:   Discharged to home      Attestation:  I have reviewed today's vital signs, notes, medications, labs and imaging.    Susan Rosario MD

## 2023-05-23 NOTE — LACTATION NOTE
Lactation consultant to patient room to [facility] grade breast pump and answer questions. Mom states that milk starting to come in and she feels encouraged. Reviewed breast lift technique for engorgement, answered questions about bringing pumped milk to NICU, millk storage and pump cleaning and sterilizing guidelines.

## 2023-05-23 NOTE — PLAN OF CARE
Goal Outcome Evaluation:      Plan of Care Reviewed With: patient, spouse    Overall Patient Progress: improvingOverall Patient Progress: improving    Outcome Evaluation: overall improvement, incisional pain mostly on left side of incision 4-5/10, oxycodone 5 mg. oral every 4 hours prn continued with scheduled tylenol and ibuprofen every 6 hours.  No problems sleeping as verbalized when asked.  Able to do acitivities of daily living independently.

## 2023-05-23 NOTE — DISCHARGE INSTRUCTIONS
Warning Signs after Having a Baby    Keep this paper on your fridge or somewhere else where you can see it.    Call your provider if you have any of these symptoms up to 12 weeks after having your baby.    Thoughts of hurting yourself or your baby  Pain in your chest or trouble breathing  Severe headache not helped by pain medicine  Eyesight concerns (blurry vision, seeing spots or flashes of light, other changes to eyesight)  Fainting, shaking or other signs of a seizure    Call 9-1-1 if you feel that it is an emergency.     The symptoms below can happen to anyone after giving birth. They can be very serious. Call your provider if you have any of these warning signs.    My provider s phone number: _______________________    Losing too much blood (hemorrhage)    Call your provider if you soak through a pad in less than an hour or pass blood clots bigger than a golf ball. These may be signs that you are bleeding too much.    Blood clots in the legs or lungs    After you give birth, your body naturally clots its blood to help prevent blood loss. Sometimes this increased clotting can happen in other areas of the body, like the legs or lungs. This can block your blood flow and be very dangerous.     Call your provider if you:  Have a red, swollen spot on the back of your leg that is warm or painful when you touch it.   Are coughing up blood.     Infection    Call your provider if you have any of these symptoms:  Fever of 100.4 F (38 C) or higher.  Pain or redness around your stitches if you had an incision.   Any yellow, white, or green fluid coming from places where you had stitches or surgery.    Mood Problems (postpartum depression)    Many people feel sad or have mood changes after having a baby. But for some people, these mood swings are worse.     Call your provider right away if you feel so anxious or nervous that you can't care for yourself or your baby.    Preeclampsia (high blood pressure)    Even if you  didn't have high blood pressure when you were pregnant, you are at risk for the high blood pressure disease called preeclampsia. This risk can last up to 12 weeks after giving birth.     Call your provider if you have:   Pain on your right side under your rib cage  Sudden swelling in the hands and face    Remember: You know your body. If something doesn't feel right, get medical help.     For informational purposes only. Not to replace the advice of your health care provider. Copyright 2020 [facility]. All rights reserved. Clinically reviewed by Nieves Pascual, RNC-OB, MSN. [employer] [id number] - Rev 02/23.

## 2023-05-23 NOTE — PLAN OF CARE
Problem: Plan of Care - These are the overarching goals to be used throughout the patient stay.    Goal: Plan of Care Review  Description: The Plan of Care Review/Shift note should be completed every shift.  The Outcome Evaluation is a brief statement about your assessment that the patient is improving, declining, or no change.  This information will be displayed automatically on your shift note.  Outcome: Adequate for Care Transition     Patient meets postpartum criteria to be discharged. Vital signs stable and afebrile. Voiding frequently and passing gas. Pain managed with Ibuprofen and Tylenol (see MAR). Declines oxycodone. Independent in her own cares. Ambulating to NICU frequently to visit  twins.     Educated on prescriptions, warning signs, and follow-up appointment. All questions and concerns answered at this time.

## 2023-05-23 NOTE — PLAN OF CARE
Goal Outcome Evaluation: Progressing       Plan of Care Reviewed With: patient    Overall Patient Progress: improvingOverall Patient Progress: improving    Outcome Evaluation: Overall improvement; incisional pain still at 4-5/10.    Shayna's VSS.  She's ambulating to NICU to see her babies every couple hours.  She continues to pump colostrum for them, now getting several drops.  She understands the  need to continue pumping to have a good milk supply moving forward.  Shayna has expressed her concern and worry for Baby A, who has pneumonia and had a spinal tap earlier this shift.  She's been emotional this evening. She verbalized feeling grateful that her  has been able to advocate for their babies, because she feels unable to.  He has gone home for the night to be with their other children.

## 2023-06-21 ENCOUNTER — LACTATION ENCOUNTER (OUTPATIENT)
Age: 33
End: 2023-06-21

## 2023-06-21 NOTE — LACTATION NOTE
This note was copied from a baby's chart.  Lactation consultant to patient room 2x this am per social work request but mother not in room. Will continue to attempt follow up support visit.

## 2023-06-27 ENCOUNTER — LAB REQUISITION (OUTPATIENT)
Dept: LAB | Facility: CLINIC | Age: 33
End: 2023-06-27

## 2023-06-27 PROCEDURE — 87624 HPV HI-RISK TYP POOLED RSLT: CPT | Performed by: OBSTETRICS & GYNECOLOGY

## 2023-06-27 PROCEDURE — G0145 SCR C/V CYTO,THINLAYER,RESCR: HCPCS | Performed by: OBSTETRICS & GYNECOLOGY

## 2023-06-30 LAB
BKR LAB AP GYN ADEQUACY: NORMAL
BKR LAB AP GYN INTERPRETATION: NORMAL
BKR LAB AP HPV REFLEX: NORMAL
BKR LAB AP LMP: NORMAL
BKR LAB AP PREVIOUS ABNL DX: NORMAL
BKR LAB AP PREVIOUS ABNORMAL: NORMAL
PATH REPORT.COMMENTS IMP SPEC: NORMAL
PATH REPORT.COMMENTS IMP SPEC: NORMAL
PATH REPORT.RELEVANT HX SPEC: NORMAL

## 2023-07-03 LAB
HUMAN PAPILLOMA VIRUS 16 DNA: NEGATIVE
HUMAN PAPILLOMA VIRUS 18 DNA: NEGATIVE
HUMAN PAPILLOMA VIRUS FINAL DIAGNOSIS: NORMAL
HUMAN PAPILLOMA VIRUS OTHER HR: NEGATIVE

## 2023-08-06 ENCOUNTER — HEALTH MAINTENANCE LETTER (OUTPATIENT)
Age: 33
End: 2023-08-06

## 2024-09-28 ENCOUNTER — HEALTH MAINTENANCE LETTER (OUTPATIENT)
Age: 34
End: 2024-09-28

## (undated) DEVICE — SUTURE PDS 0 60IN CTX+ LOOPED PDP990G

## (undated) DEVICE — GLOVE SURG PI ULTRA TOUCH M SZ 7-1/2 LF

## (undated) DEVICE — SU PLAIN 0 TIE 54" S104H

## (undated) DEVICE — GLOVE SURG PI ULTRA TOUCH M SZ 7 LF 42670

## (undated) DEVICE — PLATE GROUNDING ADULT W/CORD 9165L

## (undated) DEVICE — DRSG PRIMAPORE 04X11 3/4"

## (undated) DEVICE — SU CHROMIC 0 CT 36" 914H

## (undated) DEVICE — CUSTOM PACK C-SECTION LHE

## (undated) DEVICE — PREP CHLORAPREP 26ML TINTED HI-LITE ORANGE 930815

## (undated) DEVICE — SUTURE VICRYL+ 3-0 27IN CT-1 UND VCP258H

## (undated) DEVICE — GOWN XXL 9575

## (undated) DEVICE — PACK MINOR SINGLE BASIN SSK3001

## (undated) DEVICE — DRESSING MEPILEX BORDER POST-OP 4X10

## (undated) DEVICE — STPL SKIN SUBCUTICULAR INSORB  2030

## (undated) DEVICE — SOL NACL 0.9% IRRIG 1000ML BOTTLE 2F7124

## (undated) DEVICE — GLOVE SURG PI ULTRA TOUCH M SZ 6-1/2 LF

## (undated) DEVICE — PACK MAJOR BASIN 673

## (undated) RX ORDER — FENTANYL CITRATE 50 UG/ML
INJECTION, SOLUTION INTRAMUSCULAR; INTRAVENOUS
Status: DISPENSED
Start: 2023-05-20

## (undated) RX ORDER — KETOROLAC TROMETHAMINE 30 MG/ML
INJECTION, SOLUTION INTRAMUSCULAR; INTRAVENOUS
Status: DISPENSED
Start: 2023-05-20

## (undated) RX ORDER — MORPHINE SULFATE 1 MG/ML
INJECTION, SOLUTION EPIDURAL; INTRATHECAL; INTRAVENOUS
Status: DISPENSED
Start: 2023-05-20

## (undated) RX ORDER — ONDANSETRON 2 MG/ML
INJECTION INTRAMUSCULAR; INTRAVENOUS
Status: DISPENSED
Start: 2023-05-20